# Patient Record
Sex: FEMALE | Race: WHITE | NOT HISPANIC OR LATINO | Employment: FULL TIME | URBAN - METROPOLITAN AREA
[De-identification: names, ages, dates, MRNs, and addresses within clinical notes are randomized per-mention and may not be internally consistent; named-entity substitution may affect disease eponyms.]

---

## 2017-03-10 ENCOUNTER — OFFICE VISIT (OUTPATIENT)
Dept: FAMILY MEDICINE CLINIC | Facility: CLINIC | Age: 22
End: 2017-03-10

## 2017-03-10 VITALS
HEART RATE: 64 BPM | BODY MASS INDEX: 27.01 KG/M2 | DIASTOLIC BLOOD PRESSURE: 58 MMHG | WEIGHT: 134 LBS | HEIGHT: 59 IN | TEMPERATURE: 98.4 F | OXYGEN SATURATION: 97 % | SYSTOLIC BLOOD PRESSURE: 108 MMHG | RESPIRATION RATE: 16 BRPM

## 2017-03-10 DIAGNOSIS — F41.9 ANXIETY: Primary | ICD-10-CM

## 2017-03-10 PROCEDURE — 99213 OFFICE O/P EST LOW 20 MIN: CPT | Performed by: PHYSICIAN ASSISTANT

## 2017-03-10 NOTE — PROGRESS NOTES
"Subjective   Rianna Montano is a 21 y.o. female.   Chief Complaint   Patient presents with   • Anxiety   • Follow-up       History of Present Illness   Rianna is a 1 year old female who present for anxiety.  She has been taking the Zoloft 50 mg once a day.  She has sen an improvement on the medication.   Denied any suicidal or homicidal thoughts.  Appetite and sleep has been normal.  Rianna has no complaints at today's office visit.        The following portions of the patient's history were reviewed and updated as appropriate: allergies, current medications, past family history, past medical history, past social history and past surgical history.    Review of Systems   Constitutional: Negative.    HENT: Negative.    Eyes: Negative.    Respiratory: Negative.    Cardiovascular: Negative.    Gastrointestinal: Negative.    Endocrine: Negative.    Genitourinary: Negative.    Musculoskeletal: Negative.    Skin: Negative.    Allergic/Immunologic: Negative.    Neurological: Negative.    Hematological: Negative.    Psychiatric/Behavioral: Negative.  Negative for sleep disturbance and suicidal ideas.   All other systems reviewed and are negative.    Vitals:    03/10/17 1545   BP: 108/58   BP Location: Right arm   Patient Position: Sitting   Cuff Size: Adult   Pulse: 64   Resp: 16   Temp: 98.4 °F (36.9 °C)   TempSrc: Oral   SpO2: 97%   Weight: 134 lb (60.8 kg)   Height: 58.5\" (148.6 cm)     Wt Readings from Last 3 Encounters:   03/10/17 134 lb (60.8 kg)   12/09/16 139 lb (63 kg)   09/30/16 133 lb (60.3 kg)       BP Readings from Last 3 Encounters:   03/10/17 108/58   12/09/16 110/60   09/30/16 120/70     Body mass index is 27.53 kg/(m^2).    Allergies   Allergen Reactions   • Other      Dogs, Cats       Objective   Physical Exam   Constitutional: She is oriented to person, place, and time. Vital signs are normal. She appears well-developed and well-nourished.   Neck: Trachea normal and phonation normal. Neck supple. "   Cardiovascular: Normal rate, regular rhythm, S1 normal, S2 normal, normal heart sounds and normal pulses.    Pulmonary/Chest: Effort normal and breath sounds normal.   Abdominal: Soft. Normal appearance and bowel sounds are normal. There is no hepatomegaly. There is no tenderness.   Neurological: She is alert and oriented to person, place, and time.   Skin: Skin is warm, dry and intact.   Psychiatric: She has a normal mood and affect. Her speech is normal and behavior is normal. Judgment and thought content normal. Cognition and memory are normal.       Assessment/Plan   Rianna was seen today for anxiety and follow-up.    Diagnoses and all orders for this visit:    Anxiety  -     sertraline (ZOLOFT) 50 MG tablet; Take 1 tablet by mouth Daily.        Ms. Rianna Montano was seen in office today for follow-up on anxiety.  She is currently doing well with her sertraline 50 mg prescription.  I have refilled this to her local pharmacy.  Rianna will return to office in 6 months for reevaluation.        Dragon transcription disclaimer    Much of this encounter note is an electronic transcription/translation of spoken language to printed text.  The electronic translation of spoken language may permit erroneous, or at times, nonsensical words or phrases to be inadvertently transcribed.  Although I have reviewed the note for such errors, some may still exist.    Mirela Eisenberg PA-C  Family Practice

## 2017-05-25 ENCOUNTER — OFFICE VISIT (OUTPATIENT)
Dept: FAMILY MEDICINE CLINIC | Facility: CLINIC | Age: 22
End: 2017-05-25

## 2017-05-25 VITALS
BODY MASS INDEX: 28.43 KG/M2 | WEIGHT: 141 LBS | OXYGEN SATURATION: 98 % | DIASTOLIC BLOOD PRESSURE: 64 MMHG | HEART RATE: 87 BPM | HEIGHT: 59 IN | RESPIRATION RATE: 18 BRPM | SYSTOLIC BLOOD PRESSURE: 92 MMHG

## 2017-05-25 DIAGNOSIS — B37.9 CANDIDA ALBICANS INFECTION: Primary | ICD-10-CM

## 2017-05-25 PROCEDURE — 99212 OFFICE O/P EST SF 10 MIN: CPT | Performed by: NURSE PRACTITIONER

## 2017-05-25 RX ORDER — CLOTRIMAZOLE AND BETAMETHASONE DIPROPIONATE 10; .64 MG/G; MG/G
CREAM TOPICAL 2 TIMES DAILY
Qty: 45 G | Refills: 0 | Status: SHIPPED | OUTPATIENT
Start: 2017-05-25 | End: 2017-06-16

## 2017-06-07 ENCOUNTER — OFFICE VISIT (OUTPATIENT)
Dept: FAMILY MEDICINE CLINIC | Facility: CLINIC | Age: 22
End: 2017-06-07

## 2017-06-07 VITALS
RESPIRATION RATE: 16 BRPM | HEART RATE: 73 BPM | SYSTOLIC BLOOD PRESSURE: 122 MMHG | BODY MASS INDEX: 28.22 KG/M2 | WEIGHT: 140 LBS | DIASTOLIC BLOOD PRESSURE: 68 MMHG | OXYGEN SATURATION: 100 % | HEIGHT: 59 IN | TEMPERATURE: 98 F

## 2017-06-07 DIAGNOSIS — L98.9 SCALP LESION: Primary | ICD-10-CM

## 2017-06-07 PROCEDURE — 99213 OFFICE O/P EST LOW 20 MIN: CPT | Performed by: PHYSICIAN ASSISTANT

## 2017-06-07 NOTE — PROGRESS NOTES
"Subjective   Rianna Montano is a 22 y.o. female.   Chief Complaint   Patient presents with   • bump on head     History of Present Illness     Rianna is a 22-year-old female who presents for evaluation of \"bump on head\". Mother States that she noticed a \"bump\" on top of her head.  Rianna has been picking at it off and on.  The \"bump into \"bump appeared in April that has gotten bigger since then.  She has noticed some bleeding after she has picked at lesion.  Rianna has neosporin with some relief.  Appetite and sleep has been normal.    The following portions of the patient's history were reviewed and updated as appropriate: allergies, current medications, past family history, past medical history, past social history and past surgical history.    Review of Systems   Constitutional: Negative.    HENT: Negative.    Eyes: Negative.    Respiratory: Negative.    Cardiovascular: Negative.    Gastrointestinal: Negative.    Endocrine: Negative.    Genitourinary: Negative.    Musculoskeletal: Negative.    Skin: Negative.         Scalp lesion   Allergic/Immunologic: Negative.    Neurological: Negative.    Hematological: Negative.    Psychiatric/Behavioral: Negative.    All other systems reviewed and are negative.    Vitals:    06/07/17 1519   BP: 122/68   BP Location: Right arm   Patient Position: Sitting   Cuff Size: Adult   Pulse: 73   Resp: 16   Temp: 98 °F (36.7 °C)   SpO2: 100%   Weight: 140 lb (63.5 kg)   Height: 58.5\" (148.6 cm)       Wt Readings from Last 3 Encounters:   06/07/17 140 lb (63.5 kg)   05/25/17 141 lb (64 kg)   03/10/17 134 lb (60.8 kg)       BP Readings from Last 3 Encounters:   06/07/17 122/68   05/25/17 92/64   03/10/17 108/58     Body mass index is 28.76 kg/(m^2).   Allergies   Allergen Reactions   • Other      Dogs, Cats     Objective   Physical Exam   Constitutional: She is oriented to person, place, and time. Vital signs are normal. She appears well-developed and well-nourished.   Neck: Trachea " normal and phonation normal. No edema present.   Cardiovascular: Normal rate, regular rhythm, S1 normal, S2 normal, normal heart sounds and normal pulses.    Pulmonary/Chest: Effort normal and breath sounds normal.   Abdominal: Soft. Normal appearance and bowel sounds are normal. There is no hepatomegaly. There is no tenderness.   Neurological: She is alert and oriented to person, place, and time.   Skin: Skin is warm, dry and intact. Lesion noted.        Psychiatric: She has a normal mood and affect. Her speech is normal and behavior is normal. Judgment and thought content normal. Cognition and memory are normal.       Assessment/Plan   Rianna was seen today for bump on head.    Diagnoses and all orders for this visit:    Scalp lesion  -     Ambulatory Referral to General Surgery      Mr. Rianna Montano was seen in office today for new atypical scalp lesion.  I will schedule a referral to general surgeon for evaluation and treatment.      Dragon transcription disclaimer    Much of this encounter note is an electronic transcription/translation of spoken language to printed text.  The electronic translation of spoken language may permit erroneous, or at times, nonsensical words or phrases to be inadvertently transcribed.  Although I have reviewed the note for such errors, some may still exist.    Mirela Eisenberg PA-C  Family Practice

## 2017-06-16 ENCOUNTER — OFFICE VISIT (OUTPATIENT)
Dept: SURGERY | Facility: CLINIC | Age: 22
End: 2017-06-16

## 2017-06-16 VITALS
HEIGHT: 59 IN | HEART RATE: 60 BPM | RESPIRATION RATE: 12 BRPM | BODY MASS INDEX: 27.62 KG/M2 | SYSTOLIC BLOOD PRESSURE: 110 MMHG | TEMPERATURE: 98.8 F | OXYGEN SATURATION: 98 % | DIASTOLIC BLOOD PRESSURE: 62 MMHG | WEIGHT: 137 LBS

## 2017-06-16 DIAGNOSIS — L98.0 PYOGENIC GRANULOMA: ICD-10-CM

## 2017-06-16 DIAGNOSIS — R22.0 SCALP MASS: Primary | ICD-10-CM

## 2017-06-16 PROCEDURE — 99203 OFFICE O/P NEW LOW 30 MIN: CPT | Performed by: SURGERY

## 2017-06-16 NOTE — PROGRESS NOTES
"    PATIENT INFORMATION  Rianna Montano   - 1995    CHIEF COMPLAINT  Chief Complaint   Patient presents with   • Suspicious Skin Lesion   SCALP LESION POSTERIOR X 2 MONTHS, DOES BLEED, HAS INCREASED IN SIZE  Referral from Mirela Griffin PAC    HISTORY OF PRESENT ILLNESS  HPI    Patient is a very pleasant 22-year-old female referred by Mirela Griffin for \"a bump on my head\" she reports she noticed this a few months ago.  It has increased in size and will bleed upon touching and any other kind of trauma.  She has been \"picking on it\"  She does also complain of some occasional serous to bloody drainage.  Denies any fevers chills.  She is not aware of any other skin lesions or masses.  She does have a family history of basal cell cancer in her mother, maternal grandmother and paternal grandfather.  She is unaware of family history or personal history of soft tissue/ skin infections.    REVIEW OF SYSTEMS  Review of Systems   Constitutional: Negative.    Respiratory: Negative.    Cardiovascular: Negative.    Gastrointestinal: Negative.    Skin: Positive for color change.        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]   • Abnormal weight gain [R63.5]   • Annual physical exam [Z00.00]   • Anxiety [F41.9]   • Pain in thoracic spine [M54.6]   • Pendulous breast [N64.89]   • Pityriasis rosea [L42]         PAST MEDICAL HISTORY  Past Medical History:   Diagnosis Date   • Bronchitis          SURGICAL HISTORY  Past Surgical History:   Procedure Laterality Date   • BILATERAL BREAST REDUCTION     • TONSILLECTOMY AND ADENOIDECTOMY           FAMILY HISTORY  Family History   Problem Relation Age of Onset   • Depression Mother    • Anxiety disorder Mother    • Basal cell carcinoma Mother    • Hypertension Father    • Hypertension Paternal Grandmother    • Basal cell carcinoma Maternal Grandmother    • Skin " "cancer Paternal Grandfather          SOCIAL HISTORY  Social History     Occupational History   • Not on file.     Social History Main Topics   • Smoking status: Never Smoker   • Smokeless tobacco: Not on file   • Alcohol use No   • Drug use: Not on file   • Sexual activity: Not on file         CURRENT MEDICATIONS    Current Outpatient Prescriptions:   •  PREVIFEM 0.25-35 MG-MCG per tablet, , Disp: , Rfl: 0  •  sertraline (ZOLOFT) 50 MG tablet, Take 1 tablet by mouth Daily., Disp: 30 tablet, Rfl: 6  •  spironolactone (ALDACTONE) 50 MG tablet, TK 1 T PO BID, Disp: , Rfl: 11    ALLERGIES  Other    VITALS  Vitals:    06/16/17 1110   BP: 110/62   Pulse: 60   Resp: 12   Temp: 98.8 °F (37.1 °C)   SpO2: 98%   Weight: 137 lb (62.1 kg)   Height: 58.5\" (148.6 cm)       LAST RESULTS   Hospital Outpatient Visit on 12/18/2015   Component Date Value Ref Range Status   • WBC 12/16/2015 9.16  4.50 - 10.70 K/Cumm Final   • RBC 12/16/2015 5.09  3.90 - 5.20 Million Final   • Hemoglobin 12/16/2015 13.1  11.9 - 15.5 g/dL Final   • Hematocrit 12/16/2015 41.8  35.6 - 45.5 % Final   • MCV 12/16/2015 82.1  80.5 - 98.2 fL Final   • MCH 12/16/2015 25.7* 26.9 - 32.0 pg Final   • MCHC 12/16/2015 31.3* 32.4 - 36.3 g/dL Final   • RDW 12/16/2015 15.8* 11.7 - 13.0 % Final   • Platelets 12/16/2015 249  140 - 500 K/Cumm Final   • HCG Qualitative 12/16/2015 Negative   Final   • CONVERTED (HISTORICAL) CASE TYPE 12/18/2015 Surgical Pathology   Final   • CONVERTED (HISTORICAL) ACCESSION N* 12/18/2015 [id number]   Final   • CONVERTED (HISTORICAL) RESULT STAT* 12/18/2015 FINAL   Final   • CONVERTED (HISTORICAL) SPECIMEN DE* 12/18/2015 RIGHT BREAST TISSUE - 513 GRAMS REMOVED FROM PATIENT @ 08:18  PLACED IN FORMALIN @ 09:03. jl   Final     No results found.    PHYSICAL EXAM  Physical Exam   Constitutional: She appears well-developed and well-nourished.   HENT:   Head: Normocephalic and atraumatic.   Posterior scalp occipital region there is a 2 cm x 1.5 cm " mass that appears to be a pyogenic granuloma.  No bleeding noted.   Cardiovascular: Normal rate, regular rhythm and normal heart sounds.    Pulmonary/Chest: Breath sounds normal.   Abdominal: Soft. Bowel sounds are normal.   Nursing note and vitals reviewed.      ASSESSMENT  Scalp mass    We have discussed options of excision with Rianna and her parents.  We discussed excision in the office under local anesthesia versus in the operating room with IV sedation and local anesthesia.  Pros and cons risks and benefits of all been discussed in detail.  They would like to proceed with excision in the operating room.  She will be scheduled for wide excision at [facility].  The procedure, risks, benefits, complications including but not limited to risk of bleeding, infection, hematoma formation, seroma formation, flap ischemia, flap necrosis,risk of residual disease, risk of recurrence and complications associated with sedation were thoroughly discussed with them they understand and gave verbal informed consent.    PLAN  Follow-up after surgery.  Patient was advised to call the office sooner should she have worsening symptoms or go to the nearest emergency room.

## 2017-06-17 NOTE — H&P
"    PATIENT INFORMATION  Rianna Montano   - 1995    CHIEF COMPLAINT  Chief Complaint   Patient presents with   • Suspicious Skin Lesion   SCALP LESION POSTERIOR X 2 MONTHS, DOES BLEED, HAS INCREASED IN SIZE  Referral from Mirela Griffin PAC    HISTORY OF PRESENT ILLNESS  HPI    Patient is a very pleasant 22-year-old female referred by Mirela Griffin for \"a bump on my head\" she reports she noticed this a few months ago.  It has increased in size and will bleed upon touching and any other kind of trauma.  She has been \"picking on it\"  She does also complain of some occasional serous to bloody drainage.  Denies any fevers chills.  She is not aware of any other skin lesions or masses.  She does have a family history of basal cell cancer in her mother, maternal grandmother and paternal grandfather.  She is unaware of family history or personal history of soft tissue/ skin infections.    REVIEW OF SYSTEMS  Review of Systems   Constitutional: Negative.    Respiratory: Negative.    Cardiovascular: Negative.    Gastrointestinal: Negative.    Skin: Positive for color change.        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]   • Abnormal weight gain [R63.5]   • Annual physical exam [Z00.00]   • Anxiety [F41.9]   • Pain in thoracic spine [M54.6]   • Pendulous breast [N64.89]   • Pityriasis rosea [L42]         PAST MEDICAL HISTORY  Past Medical History:   Diagnosis Date   • Bronchitis          SURGICAL HISTORY  Past Surgical History:   Procedure Laterality Date   • BILATERAL BREAST REDUCTION     • TONSILLECTOMY AND ADENOIDECTOMY           FAMILY HISTORY  Family History   Problem Relation Age of Onset   • Depression Mother    • Anxiety disorder Mother    • Basal cell carcinoma Mother    • Hypertension Father    • Hypertension Paternal Grandmother    • Basal cell carcinoma Maternal Grandmother    • Skin " "cancer Paternal Grandfather          SOCIAL HISTORY  Social History     Occupational History   • Not on file.     Social History Main Topics   • Smoking status: Never Smoker   • Smokeless tobacco: Not on file   • Alcohol use No   • Drug use: Not on file   • Sexual activity: Not on file         CURRENT MEDICATIONS    Current Outpatient Prescriptions:   •  PREVIFEM 0.25-35 MG-MCG per tablet, , Disp: , Rfl: 0  •  sertraline (ZOLOFT) 50 MG tablet, Take 1 tablet by mouth Daily., Disp: 30 tablet, Rfl: 6  •  spironolactone (ALDACTONE) 50 MG tablet, TK 1 T PO BID, Disp: , Rfl: 11    ALLERGIES  Other    VITALS  Vitals:    06/16/17 1110   BP: 110/62   Pulse: 60   Resp: 12   Temp: 98.8 °F (37.1 °C)   SpO2: 98%   Weight: 137 lb (62.1 kg)   Height: 58.5\" (148.6 cm)       LAST RESULTS   Hospital Outpatient Visit on 12/18/2015   Component Date Value Ref Range Status   • WBC 12/16/2015 9.16  4.50 - 10.70 K/Cumm Final   • RBC 12/16/2015 5.09  3.90 - 5.20 Million Final   • Hemoglobin 12/16/2015 13.1  11.9 - 15.5 g/dL Final   • Hematocrit 12/16/2015 41.8  35.6 - 45.5 % Final   • MCV 12/16/2015 82.1  80.5 - 98.2 fL Final   • MCH 12/16/2015 25.7* 26.9 - 32.0 pg Final   • MCHC 12/16/2015 31.3* 32.4 - 36.3 g/dL Final   • RDW 12/16/2015 15.8* 11.7 - 13.0 % Final   • Platelets 12/16/2015 249  140 - 500 K/Cumm Final   • HCG Qualitative 12/16/2015 Negative   Final   • CONVERTED (HISTORICAL) CASE TYPE 12/18/2015 Surgical Pathology   Final   • CONVERTED (HISTORICAL) ACCESSION N* 12/18/2015 [id number]   Final   • CONVERTED (HISTORICAL) RESULT STAT* 12/18/2015 FINAL   Final   • CONVERTED (HISTORICAL) SPECIMEN DE* 12/18/2015 RIGHT BREAST TISSUE - 513 GRAMS REMOVED FROM PATIENT @ 08:18  PLACED IN FORMALIN @ 09:03. jl   Final     No results found.    PHYSICAL EXAM  Physical Exam   Constitutional: She appears well-developed and well-nourished.   HENT:   Head: Normocephalic and atraumatic.   Posterior scalp occipital region there is a 2 cm x 1.5 cm " mass that appears to be a pyogenic granuloma.  No bleeding noted.   Cardiovascular: Normal rate, regular rhythm and normal heart sounds.    Pulmonary/Chest: Breath sounds normal.   Abdominal: Soft. Bowel sounds are normal.   Nursing note and vitals reviewed.      ASSESSMENT  Scalp mass    We have discussed options of excision with Rianna and her parents.  We discussed excision in the office under local anesthesia versus in the operating room with IV sedation and local anesthesia.  Pros and cons risks and benefits of all been discussed in detail.  They would like to proceed with excision in the operating room.  She will be scheduled for wide excision at [facility].  The procedure, risks, benefits, complications including but not limited to risk of bleeding, infection, hematoma formation, seroma formation, flap ischemia, flap necrosis,risk of residual disease, risk of recurrence and complications associated with sedation were thoroughly discussed with them they understand and gave verbal informed consent.    PLAN  Follow-up after surgery.  Patient was advised to call the office sooner should she have worsening symptoms or go to the nearest emergency room.

## 2017-06-19 ENCOUNTER — ANESTHESIA EVENT (OUTPATIENT)
Dept: PERIOP | Facility: HOSPITAL | Age: 22
End: 2017-06-19

## 2017-06-20 ENCOUNTER — HOSPITAL ENCOUNTER (OUTPATIENT)
Facility: HOSPITAL | Age: 22
Setting detail: HOSPITAL OUTPATIENT SURGERY
Discharge: HOME | End: 2017-06-20
Attending: SURGERY | Admitting: SURGERY

## 2017-06-20 ENCOUNTER — ANESTHESIA (OUTPATIENT)
Dept: PERIOP | Facility: HOSPITAL | Age: 22
End: 2017-06-20

## 2017-06-20 VITALS
HEART RATE: 70 BPM | WEIGHT: 138.25 LBS | HEIGHT: 59 IN | RESPIRATION RATE: 16 BRPM | OXYGEN SATURATION: 98 % | BODY MASS INDEX: 27.87 KG/M2 | SYSTOLIC BLOOD PRESSURE: 113 MMHG | TEMPERATURE: 98.1 F | DIASTOLIC BLOOD PRESSURE: 82 MMHG

## 2017-06-20 DIAGNOSIS — L98.0 PYOGENIC GRANULOMA: ICD-10-CM

## 2017-06-20 DIAGNOSIS — R22.0 SCALP MASS: ICD-10-CM

## 2017-06-20 LAB
BASOPHILS # BLD AUTO: 0.04 10*3/MM3 (ref 0–0.2)
BASOPHILS NFR BLD AUTO: 0.5 % (ref 0–2)
DEPRECATED RDW RBC AUTO: 43.8 FL (ref 37–54)
EOSINOPHIL # BLD AUTO: 0.34 10*3/MM3 (ref 0.1–0.3)
EOSINOPHIL NFR BLD AUTO: 4.3 % (ref 0–4)
ERYTHROCYTE [DISTWIDTH] IN BLOOD BY AUTOMATED COUNT: 15.2 % (ref 11.5–14.5)
HCG SERPL QL: NEGATIVE
HCT VFR BLD AUTO: 38.1 % (ref 37–47)
HGB BLD-MCNC: 12.3 G/DL (ref 12–16)
IMM GRANULOCYTES # BLD: 0.01 10*3/MM3 (ref 0–0.03)
IMM GRANULOCYTES NFR BLD: 0.1 % (ref 0–0.5)
LYMPHOCYTES # BLD AUTO: 2.34 10*3/MM3 (ref 0.6–4.8)
LYMPHOCYTES NFR BLD AUTO: 29.3 % (ref 20–45)
MCH RBC QN AUTO: 25.7 PG (ref 27–31)
MCHC RBC AUTO-ENTMCNC: 32.3 G/DL (ref 31–37)
MCV RBC AUTO: 79.7 FL (ref 81–99)
MONOCYTES # BLD AUTO: 0.51 10*3/MM3 (ref 0–1)
MONOCYTES NFR BLD AUTO: 6.4 % (ref 3–8)
NEUTROPHILS # BLD AUTO: 4.76 10*3/MM3 (ref 1.5–8.3)
NEUTROPHILS NFR BLD AUTO: 59.4 % (ref 45–70)
NRBC BLD MANUAL-RTO: 0 /100 WBC (ref 0–0)
PLATELET # BLD AUTO: 273 10*3/MM3 (ref 140–500)
PMV BLD AUTO: 10.9 FL (ref 7.4–10.4)
RBC # BLD AUTO: 4.78 10*6/MM3 (ref 4.2–5.4)
WBC NRBC COR # BLD: 8 10*3/MM3 (ref 4.8–10.8)

## 2017-06-20 PROCEDURE — 84703 CHORIONIC GONADOTROPIN ASSAY: CPT | Performed by: ANESTHESIOLOGY

## 2017-06-20 PROCEDURE — 25010000003 CEFAZOLIN PER 500 MG: Performed by: SURGERY

## 2017-06-20 PROCEDURE — 25010000002 PROPOFOL 10 MG/ML EMULSION: Performed by: NURSE ANESTHETIST, CERTIFIED REGISTERED

## 2017-06-20 PROCEDURE — 85025 COMPLETE CBC W/AUTO DIFF WBC: CPT | Performed by: SURGERY

## 2017-06-20 PROCEDURE — 25010000002 ONDANSETRON PER 1 MG: Performed by: ANESTHESIOLOGY

## 2017-06-20 PROCEDURE — 25010000002 MIDAZOLAM PER 1 MG: Performed by: ANESTHESIOLOGY

## 2017-06-20 PROCEDURE — 11422 EXC H-F-NK-SP B9+MARG 1.1-2: CPT | Performed by: SURGERY

## 2017-06-20 RX ORDER — MORPHINE SULFATE 10 MG/ML
2 INJECTION INTRAMUSCULAR; INTRAVENOUS; SUBCUTANEOUS
Status: CANCELLED | OUTPATIENT
Start: 2017-06-20 | End: 2017-06-20

## 2017-06-20 RX ORDER — SULFAMETHOXAZOLE AND TRIMETHOPRIM 800; 160 MG/1; MG/1
1 TABLET ORAL 2 TIMES DAILY
Qty: 10 TABLET | Refills: 0 | Status: SHIPPED | OUTPATIENT
Start: 2017-06-20 | End: 2017-06-25

## 2017-06-20 RX ORDER — MIDAZOLAM HYDROCHLORIDE 1 MG/ML
2 INJECTION INTRAMUSCULAR; INTRAVENOUS
Status: DISCONTINUED | OUTPATIENT
Start: 2017-06-20 | End: 2017-06-20 | Stop reason: HOSPADM

## 2017-06-20 RX ORDER — HYDROCODONE BITARTRATE AND ACETAMINOPHEN 5; 325 MG/1; MG/1
1 TABLET ORAL EVERY 6 HOURS PRN
Qty: 20 TABLET | Refills: 0 | Status: SHIPPED | OUTPATIENT
Start: 2017-06-20 | End: 2017-07-03

## 2017-06-20 RX ORDER — OXYCODONE HYDROCHLORIDE AND ACETAMINOPHEN 5; 325 MG/1; MG/1
1 TABLET ORAL ONCE AS NEEDED
Status: CANCELLED | OUTPATIENT
Start: 2017-06-20 | End: 2017-06-30

## 2017-06-20 RX ORDER — MEPERIDINE HYDROCHLORIDE 25 MG/ML
12.5 INJECTION INTRAMUSCULAR; INTRAVENOUS; SUBCUTANEOUS
Status: CANCELLED | OUTPATIENT
Start: 2017-06-20 | End: 2017-06-21

## 2017-06-20 RX ORDER — FAMOTIDINE 20 MG/1
20 TABLET, FILM COATED ORAL
Status: DISCONTINUED | OUTPATIENT
Start: 2017-06-20 | End: 2017-06-20 | Stop reason: HOSPADM

## 2017-06-20 RX ORDER — LIDOCAINE HYDROCHLORIDE AND EPINEPHRINE 10; 10 MG/ML; UG/ML
INJECTION, SOLUTION INFILTRATION; PERINEURAL AS NEEDED
Status: DISCONTINUED | OUTPATIENT
Start: 2017-06-20 | End: 2017-06-20 | Stop reason: HOSPADM

## 2017-06-20 RX ORDER — FAMOTIDINE 10 MG/ML
20 INJECTION, SOLUTION INTRAVENOUS
Status: DISCONTINUED | OUTPATIENT
Start: 2017-06-20 | End: 2017-06-20 | Stop reason: HOSPADM

## 2017-06-20 RX ORDER — ONDANSETRON 2 MG/ML
4 INJECTION INTRAMUSCULAR; INTRAVENOUS ONCE AS NEEDED
Status: DISCONTINUED | OUTPATIENT
Start: 2017-06-20 | End: 2017-06-20 | Stop reason: HOSPADM

## 2017-06-20 RX ORDER — ONDANSETRON 2 MG/ML
4 INJECTION INTRAMUSCULAR; INTRAVENOUS ONCE AS NEEDED
Status: CANCELLED | OUTPATIENT
Start: 2017-06-20

## 2017-06-20 RX ORDER — ONDANSETRON 2 MG/ML
4 INJECTION INTRAMUSCULAR; INTRAVENOUS ONCE AS NEEDED
Status: COMPLETED | OUTPATIENT
Start: 2017-06-20 | End: 2017-06-20

## 2017-06-20 RX ORDER — MIDAZOLAM HYDROCHLORIDE 1 MG/ML
1 INJECTION INTRAMUSCULAR; INTRAVENOUS
Status: DISCONTINUED | OUTPATIENT
Start: 2017-06-20 | End: 2017-06-20 | Stop reason: HOSPADM

## 2017-06-20 RX ORDER — LIDOCAINE HYDROCHLORIDE 10 MG/ML
0.5 INJECTION, SOLUTION EPIDURAL; INFILTRATION; INTRACAUDAL; PERINEURAL ONCE AS NEEDED
Status: COMPLETED | OUTPATIENT
Start: 2017-06-20 | End: 2017-06-20

## 2017-06-20 RX ORDER — SODIUM CHLORIDE 0.9 % (FLUSH) 0.9 %
1-10 SYRINGE (ML) INJECTION AS NEEDED
Status: DISCONTINUED | OUTPATIENT
Start: 2017-06-20 | End: 2017-06-20 | Stop reason: HOSPADM

## 2017-06-20 RX ORDER — SODIUM CHLORIDE, SODIUM LACTATE, POTASSIUM CHLORIDE, CALCIUM CHLORIDE 600; 310; 30; 20 MG/100ML; MG/100ML; MG/100ML; MG/100ML
9 INJECTION, SOLUTION INTRAVENOUS CONTINUOUS
Status: DISCONTINUED | OUTPATIENT
Start: 2017-06-20 | End: 2017-06-20 | Stop reason: HOSPADM

## 2017-06-20 RX ORDER — MAGNESIUM HYDROXIDE 1200 MG/15ML
LIQUID ORAL AS NEEDED
Status: DISCONTINUED | OUTPATIENT
Start: 2017-06-20 | End: 2017-06-20 | Stop reason: HOSPADM

## 2017-06-20 RX ORDER — KETOROLAC TROMETHAMINE 30 MG/ML
30 INJECTION, SOLUTION INTRAMUSCULAR; INTRAVENOUS EVERY 6 HOURS PRN
Status: CANCELLED | OUTPATIENT
Start: 2017-06-20 | End: 2017-06-23

## 2017-06-20 RX ORDER — HYDROCODONE BITARTRATE AND ACETAMINOPHEN 5; 325 MG/1; MG/1
1 TABLET ORAL ONCE AS NEEDED
Status: CANCELLED | OUTPATIENT
Start: 2017-06-20 | End: 2017-06-21

## 2017-06-20 RX ORDER — LIDOCAINE HYDROCHLORIDE 20 MG/ML
INJECTION, SOLUTION INFILTRATION; PERINEURAL AS NEEDED
Status: DISCONTINUED | OUTPATIENT
Start: 2017-06-20 | End: 2017-06-20 | Stop reason: SURG

## 2017-06-20 RX ORDER — PROPOFOL 10 MG/ML
VIAL (ML) INTRAVENOUS AS NEEDED
Status: DISCONTINUED | OUTPATIENT
Start: 2017-06-20 | End: 2017-06-20 | Stop reason: SURG

## 2017-06-20 RX ORDER — BUPIVACAINE HYDROCHLORIDE 2.5 MG/ML
INJECTION, SOLUTION EPIDURAL; INFILTRATION; INTRACAUDAL AS NEEDED
Status: DISCONTINUED | OUTPATIENT
Start: 2017-06-20 | End: 2017-06-20 | Stop reason: HOSPADM

## 2017-06-20 RX ADMIN — LIDOCAINE HYDROCHLORIDE 0.5 ML: 10 INJECTION, SOLUTION EPIDURAL; INFILTRATION; INTRACAUDAL; PERINEURAL at 08:10

## 2017-06-20 RX ADMIN — MIDAZOLAM HYDROCHLORIDE 1 MG: 1 INJECTION, SOLUTION INTRAMUSCULAR; INTRAVENOUS at 08:50

## 2017-06-20 RX ADMIN — LIDOCAINE HYDROCHLORIDE 80 MG: 20 INJECTION, SOLUTION INFILTRATION; PERINEURAL at 09:06

## 2017-06-20 RX ADMIN — CEFAZOLIN SODIUM 2 G: 2 SOLUTION INTRAVENOUS at 08:57

## 2017-06-20 RX ADMIN — FAMOTIDINE 20 MG: 10 INJECTION INTRAVENOUS at 08:24

## 2017-06-20 RX ADMIN — SODIUM CHLORIDE, POTASSIUM CHLORIDE, SODIUM LACTATE AND CALCIUM CHLORIDE 9 ML/HR: 600; 310; 30; 20 INJECTION, SOLUTION INTRAVENOUS at 08:10

## 2017-06-20 RX ADMIN — SODIUM CHLORIDE, POTASSIUM CHLORIDE, SODIUM LACTATE AND CALCIUM CHLORIDE: 600; 310; 30; 20 INJECTION, SOLUTION INTRAVENOUS at 08:28

## 2017-06-20 RX ADMIN — PROPOFOL 100 MG: 10 INJECTION, EMULSION INTRAVENOUS at 09:09

## 2017-06-20 RX ADMIN — ONDANSETRON 4 MG: 2 INJECTION, SOLUTION INTRAMUSCULAR; INTRAVENOUS at 08:24

## 2017-06-20 NOTE — ANESTHESIA POSTPROCEDURE EVALUATION
Patient: Rianna Montano    Procedure Summary     Date Anesthesia Start Anesthesia Stop Room / Location    06/20/17 0852 [id number] [facility] / [facility]       Procedure Diagnosis Surgeon Provider    HEAD NECK LESION/CYST EXCISION  Wide excision of scalp mass (N/A ) Pyogenic granuloma; Scalp mass  (Pyogenic granuloma [L98.0]; Scalp mass [R22.0]) MD Gonzalo Grewal CRNA          Anesthesia Type: MAC  Last vitals  BP      Temp      Pulse     Resp      SpO2        Post Anesthesia Care and Evaluation    Patient location during evaluation: PHASE II  Patient participation: complete - patient participated  Level of consciousness: awake and alert  Pain management: adequate  Airway patency: patent  Anesthetic complications: No anesthetic complications  PONV Status: none  Cardiovascular status: acceptable and hemodynamically stable  Respiratory status: acceptable  Hydration status: acceptable

## 2017-06-20 NOTE — ANESTHESIA PREPROCEDURE EVALUATION
Anesthesia Evaluation     Patient summary reviewed and Nursing notes reviewed   History of anesthetic complications: problem with morphine- eyes rolled back and nausea (with tonsillectomy)  NPO Solid Status: > 8 hours  NPO Liquid Status: > 8 hours     Airway   Mallampati: I  TM distance: >3 FB  Neck ROM: full  no difficulty expected  Dental - normal exam     Pulmonary - normal exam   Cardiovascular - normal exam  Exercise tolerance: good (4-7 METS)    Rhythm: regular  Rate: normal        Neuro/Psych  Seizures: possibly with morphine.  GI/Hepatic/Renal/Endo - negative ROS     Musculoskeletal (-) negative ROS    Abdominal  - normal exam   Substance History - negative use     OB/GYN          Other        Blood dyscrasia: history of anemia secondary to heavy menses.                                  Anesthesia Plan    ASA 1     MAC     intravenous induction   Anesthetic plan and risks discussed with patient and mother.  Use of blood products discussed with patient and mother .

## 2017-06-20 NOTE — OP NOTE
Date of procedure: 6/20/2017    Preoperative diagnosis: Scalp mass  Postoperative diagnosis: Same    Procedure: Wide excision scalp mass  Surgeon: Domi Hardin M.D.  Asst.: None  Anesthesia: Mac and local  Anesthesia provider: David Fraser CRNA  IVF: Per anesthesia record  EBL: 5 mL  Specimens: Scalp mass to permanent pathology    Indications for procedure: Patient is a 22-year-old female referred to general surgery with the aforementioned complaints.  She was advised excision under local anesthesia in the office versus under Mac.  Pros and cons/ risks and benefits were all discussed with her and her parents wanted to proceed with the procedure under Mac and local.  She was scheduled to undergo the excision in the operating room.  The procedure, risks, benefits, complications including but not limited to risk of bleeding, infection, hematoma formation, seroma formation, risk of flap ischemia or necrosis, risk of residual disease, risk of recurrence requiring additional procedures as well as complications associated with anesthesia were thoroughly discussed with them who understood and gave informed consent.    Description of the procedure: Patient was identified by me in the preoperative area brought to the operating room suite in supine in the operating room table.  She was then laid in the left lateral decubitus position.  After appropriate cardiopulmonary monitoring was instituted Mac anesthesia was provided by the anesthesia services please refer to the records for complete details.  The area was prepped and draped in usual sterile surgical fashion.  Local anesthetic was infiltrated.  Using a 15 surgical blade a wide elliptical excision was performed.  Incision was deepened through dermis into subcutaneous tissue and the mass was completely excised.  It measured 1 cm in length 1.4 cm in width and 0.7 cm in depth.  The wound was irrigated and hemostasis was assured with electrocautery.  There was a small bleed on  the skin edge that was immediately controlled with electrocautery.  The scalp was extremely thick.  Skin flaps had to be created.  Subcutaneous closure was achieved with 3-0 Vicryl in an interrupted fashion.  Skin edges were then approximated with 2-0 nylon in a running fashion.  Additionally 2-0 nylon vertical mattress sutures were placed along the top and bottom edges of the incision.  The wound was cleansed and Dermabond was applied.  Patient was then awakened, her anesthesia was reversed and she was taken to recovery room in stable condition. Sponge, instrument, needle counts were all correct at the end of the procedure ×2.  Patient tolerated the procedure well.

## 2017-06-20 NOTE — H&P (VIEW-ONLY)
"    PATIENT INFORMATION  Rianna Montano   - 1995    CHIEF COMPLAINT  Chief Complaint   Patient presents with   • Suspicious Skin Lesion   SCALP LESION POSTERIOR X 2 MONTHS, DOES BLEED, HAS INCREASED IN SIZE  Referral from Mirela Griffin PAC    HISTORY OF PRESENT ILLNESS  HPI    Patient is a very pleasant 22-year-old female referred by Mirela Griffin for \"a bump on my head\" she reports she noticed this a few months ago.  It has increased in size and will bleed upon touching and any other kind of trauma.  She has been \"picking on it\"  She does also complain of some occasional serous to bloody drainage.  Denies any fevers chills.  She is not aware of any other skin lesions or masses.  She does have a family history of basal cell cancer in her mother, maternal grandmother and paternal grandfather.  She is unaware of family history or personal history of soft tissue/ skin infections.    REVIEW OF SYSTEMS  Review of Systems   Constitutional: Negative.    Respiratory: Negative.    Cardiovascular: Negative.    Gastrointestinal: Negative.    Skin: Positive for color change.        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]   • Abnormal weight gain [R63.5]   • Annual physical exam [Z00.00]   • Anxiety [F41.9]   • Pain in thoracic spine [M54.6]   • Pendulous breast [N64.89]   • Pityriasis rosea [L42]         PAST MEDICAL HISTORY  Past Medical History:   Diagnosis Date   • Bronchitis          SURGICAL HISTORY  Past Surgical History:   Procedure Laterality Date   • BILATERAL BREAST REDUCTION     • TONSILLECTOMY AND ADENOIDECTOMY           FAMILY HISTORY  Family History   Problem Relation Age of Onset   • Depression Mother    • Anxiety disorder Mother    • Basal cell carcinoma Mother    • Hypertension Father    • Hypertension Paternal Grandmother    • Basal cell carcinoma Maternal Grandmother    • Skin " "cancer Paternal Grandfather          SOCIAL HISTORY  Social History     Occupational History   • Not on file.     Social History Main Topics   • Smoking status: Never Smoker   • Smokeless tobacco: Not on file   • Alcohol use No   • Drug use: Not on file   • Sexual activity: Not on file         CURRENT MEDICATIONS    Current Outpatient Prescriptions:   •  PREVIFEM 0.25-35 MG-MCG per tablet, , Disp: , Rfl: 0  •  sertraline (ZOLOFT) 50 MG tablet, Take 1 tablet by mouth Daily., Disp: 30 tablet, Rfl: 6  •  spironolactone (ALDACTONE) 50 MG tablet, TK 1 T PO BID, Disp: , Rfl: 11    ALLERGIES  Other    VITALS  Vitals:    06/16/17 1110   BP: 110/62   Pulse: 60   Resp: 12   Temp: 98.8 °F (37.1 °C)   SpO2: 98%   Weight: 137 lb (62.1 kg)   Height: 58.5\" (148.6 cm)       LAST RESULTS   Hospital Outpatient Visit on 12/18/2015   Component Date Value Ref Range Status   • WBC 12/16/2015 9.16  4.50 - 10.70 K/Cumm Final   • RBC 12/16/2015 5.09  3.90 - 5.20 Million Final   • Hemoglobin 12/16/2015 13.1  11.9 - 15.5 g/dL Final   • Hematocrit 12/16/2015 41.8  35.6 - 45.5 % Final   • MCV 12/16/2015 82.1  80.5 - 98.2 fL Final   • MCH 12/16/2015 25.7* 26.9 - 32.0 pg Final   • MCHC 12/16/2015 31.3* 32.4 - 36.3 g/dL Final   • RDW 12/16/2015 15.8* 11.7 - 13.0 % Final   • Platelets 12/16/2015 249  140 - 500 K/Cumm Final   • HCG Qualitative 12/16/2015 Negative   Final   • CONVERTED (HISTORICAL) CASE TYPE 12/18/2015 Surgical Pathology   Final   • CONVERTED (HISTORICAL) ACCESSION N* 12/18/2015 [id number]   Final   • CONVERTED (HISTORICAL) RESULT STAT* 12/18/2015 FINAL   Final   • CONVERTED (HISTORICAL) SPECIMEN DE* 12/18/2015 RIGHT BREAST TISSUE - 513 GRAMS REMOVED FROM PATIENT @ 08:18  PLACED IN FORMALIN @ 09:03. jl   Final     No results found.    PHYSICAL EXAM  Physical Exam   Constitutional: She appears well-developed and well-nourished.   HENT:   Head: Normocephalic and atraumatic.   Posterior scalp occipital region there is a 2 cm x 1.5 cm " mass that appears to be a pyogenic granuloma.  No bleeding noted.   Cardiovascular: Normal rate, regular rhythm and normal heart sounds.    Pulmonary/Chest: Breath sounds normal.   Abdominal: Soft. Bowel sounds are normal.   Nursing note and vitals reviewed.      ASSESSMENT  Scalp mass    We have discussed options of excision with Rianna and her parents.  We discussed excision in the office under local anesthesia versus in the operating room with IV sedation and local anesthesia.  Pros and cons risks and benefits of all been discussed in detail.  They would like to proceed with excision in the operating room.  She will be scheduled for wide excision at [facility].  The procedure, risks, benefits, complications including but not limited to risk of bleeding, infection, hematoma formation, seroma formation, flap ischemia, flap necrosis,risk of residual disease, risk of recurrence and complications associated with sedation were thoroughly discussed with them they understand and gave verbal informed consent.    PLAN  Follow-up after surgery.  Patient was advised to call the office sooner should she have worsening symptoms or go to the nearest emergency room.

## 2017-06-21 LAB
LAB AP CASE REPORT: NORMAL
Lab: NORMAL
PATH REPORT.FINAL DX SPEC: NORMAL

## 2017-07-03 ENCOUNTER — OFFICE VISIT (OUTPATIENT)
Dept: SURGERY | Facility: CLINIC | Age: 22
End: 2017-07-03

## 2017-07-03 VITALS
BODY MASS INDEX: 27.62 KG/M2 | DIASTOLIC BLOOD PRESSURE: 80 MMHG | WEIGHT: 137 LBS | HEIGHT: 59 IN | SYSTOLIC BLOOD PRESSURE: 112 MMHG

## 2017-07-03 DIAGNOSIS — Z09 SURGERY FOLLOW-UP: Primary | ICD-10-CM

## 2017-07-03 PROCEDURE — 99024 POSTOP FOLLOW-UP VISIT: CPT | Performed by: SURGERY

## 2017-07-03 NOTE — PROGRESS NOTES
PATIENT INFORMATION  Rianna Montano   - 1995    CHIEF COMPLAINT  Chief Complaint   Patient presents with   • Post-op Follow-up   2 wks 6 days s/p exc scalp lesion, pt is w/o complaints    HISTORY OF PRESENT ILLNESS  HPI  Patient presents to the office today for her first postoperative visit.  She is status post excision of a scalp mass/lesion.  She reports she is doing well.  Denies any fevers chills drainage or erythema around the site.      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]   • Abnormal weight gain [R63.5]   • Annual physical exam [Z00.00]   • Anxiety [F41.9]   • Pain in thoracic spine [M54.6]   • Pendulous breast [N64.89]   • Pityriasis rosea [L42]         PAST MEDICAL HISTORY  Past Medical History:   Diagnosis Date   • Bronchitis          SURGICAL HISTORY  Past Surgical History:   Procedure Laterality Date   • BILATERAL BREAST REDUCTION     • HEAD/NECK LESION/CYST EXCISION N/A 2017    Procedure: HEAD NECK LESION/CYST EXCISION  Wide excision of scalp mass;  Surgeon: Domi Hardin MD;  [facility];  Service:    • HYMENECTOMY     • TONSILLECTOMY AND ADENOIDECTOMY           FAMILY HISTORY  Family History   Problem Relation Age of Onset   • Depression Mother    • Anxiety disorder Mother    • Basal cell carcinoma Mother    • Hypertension Father    • Hypertension Paternal Grandmother    • Basal cell carcinoma Maternal Grandmother    • Skin cancer Paternal Grandfather          SOCIAL HISTORY  Social History     Occupational History   • Not on file.     Social History Main Topics   • Smoking status: Never Smoker   • Smokeless tobacco: Not on file   • Alcohol use No   • Drug use: No   • Sexual activity: Defer         CURRENT MEDICATIONS    Current  "Outpatient Prescriptions:   •  PREVIFEM 0.25-35 MG-MCG per tablet, , Disp: , Rfl: 0  •  sertraline (ZOLOFT) 50 MG tablet, Take 1 tablet by mouth Daily., Disp: 30 tablet, Rfl: 6  •  spironolactone (ALDACTONE) 50 MG tablet, TK 1 T PO BID, Disp: , Rfl: 11    ALLERGIES  Other    VITALS  Vitals:    07/03/17 0908   BP: 112/80   Weight: 137 lb (62.1 kg)   Height: 58.75\" (149.2 cm)       LAST RESULTS   Admission on 06/20/2017, Discharged on 06/20/2017   Component Date Value Ref Range Status   • WBC 06/20/2017 8.00  4.80 - 10.80 10*3/mm3 Final   • RBC 06/20/2017 4.78  4.20 - 5.40 10*6/mm3 Final   • Hemoglobin 06/20/2017 12.3  12.0 - 16.0 g/dL Final   • Hematocrit 06/20/2017 38.1  37.0 - 47.0 % Final   • MCV 06/20/2017 79.7* 81.0 - 99.0 fL Final   • MCH 06/20/2017 25.7* 27.0 - 31.0 pg Final   • MCHC 06/20/2017 32.3  31.0 - 37.0 g/dL Final   • RDW 06/20/2017 15.2* 11.5 - 14.5 % Final   • RDW-SD 06/20/2017 43.8  37.0 - 54.0 fl Final   • MPV 06/20/2017 10.9* 7.4 - 10.4 fL Final   • Platelets 06/20/2017 273  140 - 500 10*3/mm3 Final   • Neutrophil % 06/20/2017 59.4  45.0 - 70.0 % Final   • Lymphocyte % 06/20/2017 29.3  20.0 - 45.0 % Final   • Monocyte % 06/20/2017 6.4  3.0 - 8.0 % Final   • Eosinophil % 06/20/2017 4.3* 0.0 - 4.0 % Final   • Basophil % 06/20/2017 0.5  0.0 - 2.0 % Final   • Immature Grans % 06/20/2017 0.1  0.0 - 0.5 % Final   • Neutrophils, Absolute 06/20/2017 4.76  1.50 - 8.30 10*3/mm3 Final   • Lymphocytes, Absolute 06/20/2017 2.34  0.60 - 4.80 10*3/mm3 Final   • Monocytes, Absolute 06/20/2017 0.51  0.00 - 1.00 10*3/mm3 Final   • Eosinophils, Absolute 06/20/2017 0.34* 0.10 - 0.30 10*3/mm3 Final   • Basophils, Absolute 06/20/2017 0.04  0.00 - 0.20 10*3/mm3 Final   • Immature Grans, Absolute 06/20/2017 0.01  0.00 - 0.03 10*3/mm3 Final   • nRBC 06/20/2017 0.0  0.0 - 0.0 /100 WBC Final   • HCG Qualitative 06/20/2017 Negative  Negative Final   • Case Report 06/20/2017    Final                    Value:Surgical " Pathology Report                         Case: [id number]                                  Authorizing Provider:  Domi Hardin MD       Collected:           06/20/2017 09:28 AM          Ordering Location:     [facility]   Received:            06/20/2017 10:31 AM                                 OR                                                                           Pathologist:           Alvaro Barreto MD                                                      Specimen:    Scalp, scalp mass 1 cm L X 1.4 cm w X 0.7 cm D                                            • Final Diagnosis 06/20/2017    Final                    Value:This result contains rich text formatting which cannot be displayed here.     No results found.    PHYSICAL EXAM  Physical Exam   Constitutional: She appears well-developed and well-nourished.   HENT:   Incision healing well.  Stitches removed.   Nursing note and vitals reviewed.      ASSESSMENT  Status post excision scalp mass.  Wound healing nicely.  Operative and pathology results discussed.  Pathology consistent with pyogenic granuloma - benign    Risk of recurrence discussed with patient.      PLAN  Follow-up 2 weeks for wound check.  Patient was advised to call the office sooner should she worsening symptoms or go to the nearest emergency room.

## 2017-07-17 ENCOUNTER — OFFICE VISIT (OUTPATIENT)
Dept: SURGERY | Facility: CLINIC | Age: 22
End: 2017-07-17

## 2017-07-17 VITALS
TEMPERATURE: 97.8 F | WEIGHT: 136.8 LBS | HEIGHT: 59 IN | OXYGEN SATURATION: 99 % | DIASTOLIC BLOOD PRESSURE: 80 MMHG | BODY MASS INDEX: 27.58 KG/M2 | HEART RATE: 96 BPM | SYSTOLIC BLOOD PRESSURE: 114 MMHG

## 2017-07-17 DIAGNOSIS — Z09 SURGERY FOLLOW-UP: Primary | ICD-10-CM

## 2017-07-17 PROCEDURE — 99024 POSTOP FOLLOW-UP VISIT: CPT | Performed by: SURGERY

## 2017-07-17 NOTE — PROGRESS NOTES
PATIENT INFORMATION  Rianna Montano   - 1995    CHIEF COMPLAINT  Chief Complaint   Patient presents with   • Post-op Follow-up     4 WK S/P SCALP MASS       HISTORY OF PRESENT ILLNESS  HPI  Patient was as the office today for routine postoperative visit.  She status post excision of a scalp mass.  She offers no complaints.  Denies any fevers chills redness or drainage from the surgical incision.  She is resume normal household activities and work.      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]   • Abnormal weight gain [R63.5]   • Annual physical exam [Z00.00]   • Anxiety [F41.9]   • Pain in thoracic spine [M54.6]   • Pendulous breast [N64.89]   • Pityriasis rosea [L42]         PAST MEDICAL HISTORY  Past Medical History:   Diagnosis Date   • Bronchitis          SURGICAL HISTORY  Past Surgical History:   Procedure Laterality Date   • BILATERAL BREAST REDUCTION     • HEAD/NECK LESION/CYST EXCISION N/A 2017    Procedure: HEAD NECK LESION/CYST EXCISION  Wide excision of scalp mass;  Surgeon: Domi Hardin MD;  Location: [facility];  Service:    • HYMENECTOMY     • TONSILLECTOMY AND ADENOIDECTOMY           FAMILY HISTORY  Family History   Problem Relation Age of Onset   • Depression Mother    • Anxiety disorder Mother    • Basal cell carcinoma Mother    • Hypertension Father    • Hypertension Paternal Grandmother    • Basal cell carcinoma Maternal Grandmother    • Skin cancer Paternal Grandfather          SOCIAL HISTORY  Social History     Occupational History   • Not on file.     Social History Main Topics   • Smoking status: Never Smoker   • Smokeless tobacco: Not on file   • Alcohol use No   • Drug use: No   • Sexual activity: Defer         CURRENT  "MEDICATIONS    Current Outpatient Prescriptions:   •  PREVIFEM 0.25-35 MG-MCG per tablet, , Disp: , Rfl: 0  •  sertraline (ZOLOFT) 50 MG tablet, Take 1 tablet by mouth Daily., Disp: 30 tablet, Rfl: 6  •  spironolactone (ALDACTONE) 50 MG tablet, TK 1 T PO BID, Disp: , Rfl: 11    ALLERGIES  Other    VITALS  Vitals:    07/17/17 0914   BP: 114/80   Pulse: 96   Temp: 97.8 °F (36.6 °C)   TempSrc: Oral   SpO2: 99%   Weight: 136 lb 12.8 oz (62.1 kg)   Height: 58.75\" (149.2 cm)       LAST RESULTS   Admission on 06/20/2017, Discharged on 06/20/2017   Component Date Value Ref Range Status   • WBC 06/20/2017 8.00  4.80 - 10.80 10*3/mm3 Final   • RBC 06/20/2017 4.78  4.20 - 5.40 10*6/mm3 Final   • Hemoglobin 06/20/2017 12.3  12.0 - 16.0 g/dL Final   • Hematocrit 06/20/2017 38.1  37.0 - 47.0 % Final   • MCV 06/20/2017 79.7* 81.0 - 99.0 fL Final   • MCH 06/20/2017 25.7* 27.0 - 31.0 pg Final   • MCHC 06/20/2017 32.3  31.0 - 37.0 g/dL Final   • RDW 06/20/2017 15.2* 11.5 - 14.5 % Final   • RDW-SD 06/20/2017 43.8  37.0 - 54.0 fl Final   • MPV 06/20/2017 10.9* 7.4 - 10.4 fL Final   • Platelets 06/20/2017 273  140 - 500 10*3/mm3 Final   • Neutrophil % 06/20/2017 59.4  45.0 - 70.0 % Final   • Lymphocyte % 06/20/2017 29.3  20.0 - 45.0 % Final   • Monocyte % 06/20/2017 6.4  3.0 - 8.0 % Final   • Eosinophil % 06/20/2017 4.3* 0.0 - 4.0 % Final   • Basophil % 06/20/2017 0.5  0.0 - 2.0 % Final   • Immature Grans % 06/20/2017 0.1  0.0 - 0.5 % Final   • Neutrophils, Absolute 06/20/2017 4.76  1.50 - 8.30 10*3/mm3 Final   • Lymphocytes, Absolute 06/20/2017 2.34  0.60 - 4.80 10*3/mm3 Final   • Monocytes, Absolute 06/20/2017 0.51  0.00 - 1.00 10*3/mm3 Final   • Eosinophils, Absolute 06/20/2017 0.34* 0.10 - 0.30 10*3/mm3 Final   • Basophils, Absolute 06/20/2017 0.04  0.00 - 0.20 10*3/mm3 Final   • Immature Grans, Absolute 06/20/2017 0.01  0.00 - 0.03 10*3/mm3 Final   • nRBC 06/20/2017 0.0  0.0 - 0.0 /100 WBC Final   • HCG Qualitative 06/20/2017 " Negative  Negative Final   • Case Report 06/20/2017    Final                    Value:Surgical Pathology Report                         Case: [id number]                                  Authorizing Provider:  Domi Hardin MD       Collected:           06/20/2017 09:28 AM          Ordering Location[facility]     [facility]   Received:            06/20/2017 10:31 AM                                 OR                                                                           Pathologist:           Alvaro Barreto MD                                                      Specimen:    Scalp, scalp mass 1 cm L X 1.4 cm w X 0.7 cm D                                            • Final Diagnosis 06/20/2017    Final                    Value:This result contains rich text formatting which cannot be displayed here.     No results found.    PHYSICAL EXAM  Physical Exam   Constitutional: She appears well-developed and well-nourished.   HENT:   Scalp incision/wound completely healed.  No erythema, no drainage no recurrence noted   Cardiovascular: Normal rate, regular rhythm and normal heart sounds.    Pulmonary/Chest: Breath sounds normal.   Abdominal: Soft. Bowel sounds are normal.   Nursing note and vitals reviewed.      ASSESSMENT  Status post excision scalp mass.  Wounds well-healed.  No acute surgical issues at this time.    Risk of recurrence discussed with patient.       PLAN  Follow-up when necessary/as needed.  Patient was advised to call the office sooner should she have worsening symptoms or go to the nearest emergency room.

## 2017-09-15 ENCOUNTER — OFFICE VISIT (OUTPATIENT)
Dept: FAMILY MEDICINE CLINIC | Facility: CLINIC | Age: 22
End: 2017-09-15

## 2017-09-15 VITALS
BODY MASS INDEX: 27.01 KG/M2 | HEART RATE: 88 BPM | HEIGHT: 59 IN | OXYGEN SATURATION: 98 % | TEMPERATURE: 98.4 F | WEIGHT: 134 LBS | DIASTOLIC BLOOD PRESSURE: 62 MMHG | SYSTOLIC BLOOD PRESSURE: 100 MMHG | RESPIRATION RATE: 16 BRPM

## 2017-09-15 DIAGNOSIS — T78.40XA ALLERGIC REACTION, INITIAL ENCOUNTER: ICD-10-CM

## 2017-09-15 DIAGNOSIS — F41.9 ANXIETY: Primary | ICD-10-CM

## 2017-09-15 PROCEDURE — 96372 THER/PROPH/DIAG INJ SC/IM: CPT | Performed by: PHYSICIAN ASSISTANT

## 2017-09-15 PROCEDURE — 99213 OFFICE O/P EST LOW 20 MIN: CPT | Performed by: PHYSICIAN ASSISTANT

## 2017-09-15 RX ORDER — METHYLPREDNISOLONE ACETATE 40 MG/ML
40 INJECTION, SUSPENSION INTRA-ARTICULAR; INTRALESIONAL; INTRAMUSCULAR; SOFT TISSUE ONCE
Status: COMPLETED | OUTPATIENT
Start: 2017-09-15 | End: 2017-09-15

## 2017-09-15 RX ORDER — METHYLPREDNISOLONE 4 MG/1
TABLET ORAL
Qty: 21 TABLET | Refills: 0 | Status: SHIPPED | OUTPATIENT
Start: 2017-09-15 | End: 2017-12-15

## 2017-09-15 RX ADMIN — METHYLPREDNISOLONE ACETATE 40 MG: 40 INJECTION, SUSPENSION INTRA-ARTICULAR; INTRALESIONAL; INTRAMUSCULAR; SOFT TISSUE at 15:43

## 2017-09-15 NOTE — PROGRESS NOTES
Subjective   Rianna Montano is a 22 y.o. female.   Chief Complaint   Patient presents with   • Anxiety     management   • Allergic Reaction     face swollen due to new lotion       History of Present Illness     Rianna is a 22-year-old female who presents for anxiety management.  She has been taking her Sertraline 50 mg tablets daily. She thinks the medication is working but has had breakthrough anxiety during her menstrual cycles.  She has had a couple episodes of increased anxiety when she has confrontations or when she gets upset.  Denied any suicidal or homicidal thoughts. Sleep eagle been normal.  Sleep has been normal.  Going to [employer].  School is going well. She stated started using Neutrogena facial cream that has SPF 15 2 days ago.  Since this time she's developed a red itchy rash and swollen face.  She has not changed any other lotions, soaps or foods.  Rianna has not used any over-the-counter medications for the allergic reaction.  Rianna denied any shortness of breath, difficulty swallowing or breathing.  Last LMP was September 15,2017.      The following portions of the patient's history were reviewed and updated as appropriate: allergies, current medications, past family history, past medical history, past social history and past surgical history.    Review of Systems   Constitutional: Negative.    HENT: Positive for facial swelling. Negative for congestion, ear discharge, ear pain, mouth sores, postnasal drip, rhinorrhea, sinus pressure, sneezing, sore throat and trouble swallowing.    Eyes: Negative.    Respiratory: Negative.  Negative for shortness of breath and wheezing.    Cardiovascular: Negative.  Negative for chest pain, palpitations and leg swelling.   Gastrointestinal: Negative.    Endocrine: Negative.    Genitourinary: Negative.    Musculoskeletal: Negative.    Skin: Positive for rash.   Allergic/Immunologic: Negative.    Neurological: Negative.  Negative for dizziness, light-headedness and  "headaches.   Hematological: Negative.    Psychiatric/Behavioral: Negative.  Negative for sleep disturbance and suicidal ideas.   All other systems reviewed and are negative.    Vitals:    09/15/17 1419   BP: 100/62   BP Location: Left arm   Patient Position: Sitting   Cuff Size: Adult   Pulse: 88   Resp: 16   Temp: 98.4 °F (36.9 °C)   TempSrc: Oral   SpO2: 98%   Weight: 134 lb (60.8 kg)   Height: 58.75\" (149.2 cm)       Wt Readings from Last 3 Encounters:   09/15/17 134 lb (60.8 kg)   07/17/17 136 lb 12.8 oz (62.1 kg)   07/03/17 137 lb (62.1 kg)       BP Readings from Last 3 Encounters:   09/15/17 100/62   07/17/17 114/80   07/03/17 112/80     Body mass index is 27.3 kg/(m^2).  Allergies   Allergen Reactions   • Other      Dogs, Cats       Objective   Physical Exam   Constitutional: She is oriented to person, place, and time. Vital signs are normal. She appears well-developed and well-nourished.   HENT:   Head: Normocephalic and atraumatic.   Right Ear: Hearing, tympanic membrane, external ear and ear canal normal.   Left Ear: Hearing, tympanic membrane, external ear and ear canal normal.   Nose: Nose normal. Right sinus exhibits no maxillary sinus tenderness and no frontal sinus tenderness. Left sinus exhibits no maxillary sinus tenderness and no frontal sinus tenderness.   Mouth/Throat: Uvula is midline, oropharynx is clear and moist and mucous membranes are normal.   Eyes: Conjunctivae, EOM and lids are normal.   Neck: Trachea normal and phonation normal. Neck supple. No edema present.   Cardiovascular: Normal rate, regular rhythm, S1 normal, S2 normal, normal heart sounds and normal pulses.    Pulmonary/Chest: Effort normal and breath sounds normal.   Abdominal: Soft. Normal appearance, normal aorta and bowel sounds are normal. There is no hepatomegaly. There is no tenderness.   Lymphadenopathy:     She has no cervical adenopathy.   Neurological: She is alert and oriented to person, place, and time.   Skin: Skin " is warm, dry and intact. Rash noted.   There is an erythematous rash on forehead and cheeks bilaterally.  Her face is puffy and swollen.   Psychiatric: She has a normal mood and affect. Her speech is normal and behavior is normal. Judgment and thought content normal. Cognition and memory are normal.       Assessment/Plan   Rianna was seen today for anxiety and allergic reaction.    Diagnoses and all orders for this visit:    Anxiety  -     sertraline (ZOLOFT) 50 MG tablet; Take 1 1/2 tablets once a day    Allergic reaction, initial encounter  -     methylPREDNISolone acetate (DEPO-medrol) injection 40 mg; Inject 1 mL into the shoulder, thigh, or buttocks 1 (One) Time.  -     MethylPREDNISolone (MEDROL, DIAMANTE,) 4 MG tablet; Take as directed on package instructions.    1.  Anxiety: I will increase Rianna's sertraline to 75 mg a day.  This is due to her having occasional breakthrough anxiety especially around her cycle.  She will schedule follow-up appointment in 3 months for reevaluation.  I have sent a new prescription to pharmacy.  2.  New allergic reaction to Neutrogena facial cream: Rianna will have a Depo-Medrol 40 mg IM injection at today's office visit.  She will start the Medrol Dosepak tomorrow.  I've instructed her to purchase over-the-counter Claritin or Benadryl to help with itching and swelling.  If her symptoms worsen, she will go to the emergency room.  Rinana voiced understanding.          Anil transcription disclaimer    Much of this encounter note is an electronic transcription/translation of spoken language to printed text.  The electronic translation of spoken language may permit erroneous, or at times, nonsensical words or phrases to be inadvertently transcribed.  Although I have reviewed the note for such errors, some may still exist.    Mirela Eisenberg PA-C  Family Practice

## 2017-12-15 ENCOUNTER — OFFICE VISIT (OUTPATIENT)
Dept: FAMILY MEDICINE CLINIC | Facility: CLINIC | Age: 22
End: 2017-12-15

## 2017-12-15 VITALS
OXYGEN SATURATION: 99 % | RESPIRATION RATE: 16 BRPM | DIASTOLIC BLOOD PRESSURE: 72 MMHG | HEIGHT: 59 IN | SYSTOLIC BLOOD PRESSURE: 112 MMHG | TEMPERATURE: 98.4 F | WEIGHT: 134 LBS | HEART RATE: 97 BPM | BODY MASS INDEX: 27.01 KG/M2

## 2017-12-15 DIAGNOSIS — D48.9 NEOPLASM OF UNCERTAIN BEHAVIOR: ICD-10-CM

## 2017-12-15 DIAGNOSIS — F41.9 ANXIETY: Primary | ICD-10-CM

## 2017-12-15 PROCEDURE — 99212 OFFICE O/P EST SF 10 MIN: CPT | Performed by: PHYSICIAN ASSISTANT

## 2017-12-15 NOTE — PROGRESS NOTES
"Subjective   Rianna Montano is a 22 y.o. female.   Chief Complaint   Patient presents with   • Anxiety     management       History of Present Illness     Rianna is a 22-year-old female who presents for anxiety management.  She has been taking sertraline 75 mg once a day. Rianna is doing well with the medication. Denied any suicidal or homicidal thoughts.  Appetite and sleep has been normal. Rianna is going to  and did well.       The following portions of the patient's history were reviewed and updated as appropriate: allergies, current medications, past family history, past medical history, past social history and past surgical history.    Review of Systems   Constitutional: Negative.    HENT: Negative.    Eyes: Negative.    Respiratory: Negative.    Cardiovascular: Negative.    Gastrointestinal: Negative.    Endocrine: Negative.    Genitourinary: Negative.    Musculoskeletal: Negative.    Skin: Negative.    Allergic/Immunologic: Negative.    Neurological: Negative.    Hematological: Negative.    Psychiatric/Behavioral: Negative.  Negative for sleep disturbance and suicidal ideas.   All other systems reviewed and are negative.    Vitals:    12/15/17 1411   BP: 112/72   BP Location: Right arm   Patient Position: Sitting   Cuff Size: Adult   Pulse: 97   Resp: 16   Temp: 98.4 °F (36.9 °C)   TempSrc: Oral   SpO2: 99%   Weight: 60.8 kg (134 lb)   Height: 149.2 cm (58.75\")       Wt Readings from Last 3 Encounters:   12/15/17 60.8 kg (134 lb)   09/15/17 60.8 kg (134 lb)   07/17/17 62.1 kg (136 lb 12.8 oz)       BP Readings from Last 3 Encounters:   12/15/17 112/72   09/15/17 100/62   07/17/17 114/80     Body mass index is 27.3 kg/(m^2).  Allergies   Allergen Reactions   • Other      Dogs, Cats       Objective   Physical Exam   Constitutional: She is oriented to person, place, and time. Vital signs are normal. She appears well-developed and well-nourished.   Neck: Trachea normal and phonation normal. Neck supple. No " edema present.   Cardiovascular: Normal rate, regular rhythm, S1 normal, S2 normal, normal heart sounds and normal pulses.    Pulmonary/Chest: Effort normal and breath sounds normal.   Abdominal: Soft. Normal appearance and bowel sounds are normal. There is no hepatomegaly. There is no tenderness.   Neurological: She is alert and oriented to person, place, and time.   Skin: Skin is warm, dry and intact.        Psychiatric: She has a normal mood and affect. Her speech is normal and behavior is normal. Judgment and thought content normal. Cognition and memory are normal.       Assessment/Plan   Rianna was seen today for anxiety.    Diagnoses and all orders for this visit:    Anxiety    Neoplasm of uncertain behavior  -     Ambulatory Referral to Dermatology      1.  Stable and chronic anxiety:  Ms. Rianna Montano was seen in office today for follow-up on anxiety.  Rianna is doing well with her 75 mg of sertraline.  No refills are required at this time.  Rianna will return to office in 6 months for reevaluation.  2.  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  I will place a referral to dermatology for further evaluation and treatment.  Rianna was encouraged to use good sunscreen.        Dragon transcription disclaimer    Much of this encounter note is an electronic transcription/translation of spoken language to printed text.  The electronic translation of spoken language may permit erroneous, or at times, nonsensical words or phrases to be inadvertently transcribed.  Although I have reviewed the note for such errors, some may still exist.    Mirela Eisenberg PA-C  Family Practice

## 2018-03-02 ENCOUNTER — CLINICAL SUPPORT (OUTPATIENT)
Dept: FAMILY MEDICINE CLINIC | Facility: CLINIC | Age: 23
End: 2018-03-02

## 2018-03-02 VITALS
WEIGHT: 140 LBS | OXYGEN SATURATION: 100 % | DIASTOLIC BLOOD PRESSURE: 68 MMHG | HEIGHT: 59 IN | HEART RATE: 74 BPM | TEMPERATURE: 98.3 F | RESPIRATION RATE: 16 BRPM | SYSTOLIC BLOOD PRESSURE: 102 MMHG | BODY MASS INDEX: 28.22 KG/M2

## 2018-03-02 DIAGNOSIS — Z11.1 SCREENING EXAMINATION FOR PULMONARY TUBERCULOSIS: ICD-10-CM

## 2018-03-02 DIAGNOSIS — F41.9 ANXIETY: ICD-10-CM

## 2018-03-02 DIAGNOSIS — Z00.00 ENCOUNTER FOR ANNUAL PHYSICAL EXAM: Primary | ICD-10-CM

## 2018-03-02 PROCEDURE — 99395 PREV VISIT EST AGE 18-39: CPT | Performed by: PHYSICIAN ASSISTANT

## 2018-03-02 NOTE — PROGRESS NOTES
"Subjective   Rianna Montano is a 22 y.o. female.   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  Denied any Foreign travel in the past 6 months.  She is doing well with the Sertraline 50 mg once a day.  Denied any Suicidal or Homicidal thoughts.   States the medication ha helped with her anxiety. She takes one tablet pain daily. Denied ay URI symptoms,chest pain,shortness of air,wheezing,abdominal pain,or swelling of ankles.  Appetite has been normal.  Sleep has been good.  Last LMP was February 28,2018.      The following portions of the patient's history were reviewed and updated as appropriate: allergies, current medications, past family history, past medical history, past social history and past surgical history.    Review of Systems   Constitutional: Negative.    HENT: Negative.    Eyes: Negative.    Respiratory: Negative.    Cardiovascular: Negative.    Gastrointestinal: Negative.    Endocrine: Negative.    Genitourinary: Negative.    Musculoskeletal: Negative.    Skin: Negative.    Allergic/Immunologic: Negative.    Neurological: Negative.    Hematological: Negative.    Psychiatric/Behavioral: Negative for sleep disturbance and suicidal ideas. The patient is nervous/anxious.    All other systems reviewed and are negative.    Social History   Substance Use Topics   • Smoking status: Never Smoker   • Smokeless tobacco: None   • Alcohol use No       Vitals:    03/02/18 0950   BP: 102/68   BP Location: Right arm   Patient Position: Sitting   Cuff Size: Adult   Pulse: 74   Resp: 16   Temp: 98.3 °F (36.8 °C)   SpO2: 100%   Weight: 63.5 kg (140 lb)   Height: 149.2 cm (58.75\")       Wt Readings from Last 3 Encounters:   03/02/18 63.5 kg (140 lb)   12/15/17 60.8 kg (134 lb)   09/15/17 60.8 kg (134 lb) "       BP Readings from Last 3 Encounters:   03/02/18 102/68   12/15/17 112/72   09/15/17 100/62     Body mass index is 28.52 kg/(m^2).     Allergies   Allergen Reactions   • Other      Dogs, Cats     Objective   Physical Exam   Constitutional: She is oriented to person, place, and time. Vital signs are normal. She appears well-developed and well-nourished.   HENT:   Head: Normocephalic and atraumatic.   Right Ear: Hearing, tympanic membrane, external ear and ear canal normal.   Left Ear: Hearing, tympanic membrane, external ear and ear canal normal.   Nose: Nose normal. Right sinus exhibits no maxillary sinus tenderness and no frontal sinus tenderness. Left sinus exhibits no maxillary sinus tenderness and no frontal sinus tenderness.   Mouth/Throat: Uvula is midline, oropharynx is clear and moist and mucous membranes are normal.   Eyes: Conjunctivae, EOM and lids are normal. Pupils are equal, round, and reactive to light.   Neck: Trachea normal and phonation normal. Neck supple. No edema present. No thyromegaly present.   Cardiovascular: Normal rate, regular rhythm, S1 normal, S2 normal, normal heart sounds and normal pulses.    Pulmonary/Chest: Effort normal and breath sounds normal.   Abdominal: Soft. Normal appearance, normal aorta and bowel sounds are normal. There is no hepatomegaly. There is no tenderness.   Lymphadenopathy:     She has no cervical adenopathy.   Neurological: She is alert and oriented to person, place, and time.   Reflex Scores:       Patellar reflexes are 2+ on the right side and 2+ on the left side.  Skin: Skin is warm, dry and intact.   Psychiatric: She has a normal mood and affect. Her speech is normal and behavior is normal. Judgment and thought content normal. Cognition and memory are normal.       Assessment/Plan   Rianna was seen today for annual exam.    Diagnoses and all orders for this visit:    Encounter for annual physical exam    Anxiety  -     sertraline (ZOLOFT) 50 MG tablet;  Take 1 tablet by mouth Daily.    Screening examination for pulmonary tuberculosis  -     QuantiFERON TB Gold      1.  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  She also is required for a TB skin test.  She will have a QuantiFERON blood work collected for further evaluation of TB exposure.  She is a healthy young woman.  I will complete her physical form after TB screening test is completed.  2.  Chronic and stable anxiety: Doing well with the sertraline medication.  I have sent a refill to pharmacy.  Rianna will return to office in 6 months for reevaluation.

## 2018-03-06 LAB
ANNOTATION COMMENT IMP: NORMAL
GAMMA INTERFERON BACKGROUND BLD IA-ACNC: 0.02 IU/ML
M TB IFN-G BLD-IMP: NEGATIVE
M TB IFN-G CD4+ BCKGRND COR BLD-ACNC: 0.11 IU/ML
M TB IFN-G CD4+ T-CELLS BLD-ACNC: 0.13 IU/ML
MITOGEN IGNF BLD-ACNC: >10 IU/ML
QUANTIFERON INCUBATION: NORMAL
SERVICE CMNT-IMP: NORMAL

## 2018-03-07 ENCOUNTER — TELEPHONE (OUTPATIENT)
Dept: FAMILY MEDICINE CLINIC | Facility: CLINIC | Age: 23
End: 2018-03-07

## 2018-03-07 NOTE — TELEPHONE ENCOUNTER
----- Message from Mirela Eisenberg PA-C sent at 3/7/2018  6:37 AM EST -----  Notify patient that her QuantiFERON TB test was negative.  Please give her a copy.  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

## 2018-06-20 ENCOUNTER — OFFICE VISIT (OUTPATIENT)
Dept: FAMILY MEDICINE CLINIC | Facility: CLINIC | Age: 23
End: 2018-06-20

## 2018-06-20 VITALS
SYSTOLIC BLOOD PRESSURE: 110 MMHG | BODY MASS INDEX: 28.63 KG/M2 | DIASTOLIC BLOOD PRESSURE: 64 MMHG | OXYGEN SATURATION: 97 % | WEIGHT: 142 LBS | RESPIRATION RATE: 16 BRPM | HEART RATE: 69 BPM | HEIGHT: 59 IN | TEMPERATURE: 98.4 F

## 2018-06-20 DIAGNOSIS — F41.9 ANXIETY: Primary | ICD-10-CM

## 2018-06-20 PROCEDURE — 99212 OFFICE O/P EST SF 10 MIN: CPT | Performed by: PHYSICIAN ASSISTANT

## 2018-06-20 NOTE — PROGRESS NOTES
"Subjective   Rianna Montano is a 23 y.o. female.   Chief Complaint   Patient presents with   • Anxiety     management       History of Present Illness     Rianna is a 23 year old female presents for anxiety management.  She is student teaching in the fall.  She is doing well with the Sertraline 50 mg a day.  Denied any Suicidal or homicidal thoughts.  Appetite and sleep has been normal.  Last LMP was May 21,2018.      The following portions of the patient's history were reviewed and updated as appropriate: allergies, current medications, past family history, past medical history, past social history and past surgical history.    Review of Systems   Constitutional: Negative.    HENT: Negative.    Eyes: Negative.    Respiratory: Negative.    Cardiovascular: Negative.    Gastrointestinal: Negative.    Endocrine: Negative.    Genitourinary: Negative.    Musculoskeletal: Negative.    Skin: Negative.    Allergic/Immunologic: Negative.    Neurological: Negative.    Hematological: Negative.    Psychiatric/Behavioral: Negative for sleep disturbance and suicidal ideas. The patient is nervous/anxious.    All other systems reviewed and are negative.    Social History   Substance Use Topics   • Smoking status: Never Smoker   • Smokeless tobacco: Not on file   • Alcohol use No     Vitals:    06/20/18 1531   BP: 110/64   BP Location: Right arm   Patient Position: Sitting   Cuff Size: Adult   Pulse: 69   Resp: 16   Temp: 98.4 °F (36.9 °C)   TempSrc: Oral   SpO2: 97%   Weight: 64.4 kg (142 lb)   Height: 149.2 cm (58.75\")       Wt Readings from Last 3 Encounters:   06/20/18 64.4 kg (142 lb)   03/02/18 63.5 kg (140 lb)   12/15/17 60.8 kg (134 lb)       BP Readings from Last 3 Encounters:   06/20/18 110/64   03/02/18 102/68   12/15/17 112/72     Body mass index is 28.93 kg/m².  Allergies   Allergen Reactions   • Other      Dogs, Cats       Objective   Physical Exam   Constitutional: She is oriented to person, place, and time. Vital " signs are normal. She appears well-developed and well-nourished.   Neck: Trachea normal and phonation normal. Neck supple. No edema present.   Cardiovascular: Normal rate, regular rhythm, S1 normal, S2 normal, normal heart sounds and normal pulses.    Pulmonary/Chest: Effort normal and breath sounds normal.   Abdominal: Soft. Normal appearance and bowel sounds are normal. There is no hepatomegaly. There is no tenderness.   Neurological: She is alert and oriented to person, place, and time.   Skin: Skin is warm, dry and intact. Capillary refill takes less than 2 seconds.   Psychiatric: She has a normal mood and affect. Her speech is normal and behavior is normal. Judgment and thought content normal. Cognition and memory are normal.       Assessment/Plan   Rianna was seen today for anxiety.    Diagnoses and all orders for this visit:    Anxiety      Ms. Rianna Montano was seen in office today for her chronic and stable anxiety: Doing well with her sertraline 50 mg daily.  No refills required at this time.  I've asked Rianna to return to office in 6 months for reevaluation.  She voiced understanding.

## 2018-12-05 DIAGNOSIS — F41.9 ANXIETY: ICD-10-CM

## 2019-01-04 ENCOUNTER — OFFICE VISIT (OUTPATIENT)
Dept: FAMILY MEDICINE CLINIC | Facility: CLINIC | Age: 24
End: 2019-01-04

## 2019-01-04 VITALS
SYSTOLIC BLOOD PRESSURE: 130 MMHG | OXYGEN SATURATION: 97 % | RESPIRATION RATE: 16 BRPM | DIASTOLIC BLOOD PRESSURE: 78 MMHG | HEART RATE: 88 BPM | HEIGHT: 58 IN | WEIGHT: 149 LBS | BODY MASS INDEX: 31.28 KG/M2 | TEMPERATURE: 98.3 F

## 2019-01-04 DIAGNOSIS — Z23 NEED FOR INFLUENZA VACCINATION: ICD-10-CM

## 2019-01-04 DIAGNOSIS — F41.9 ANXIETY: Primary | ICD-10-CM

## 2019-01-04 PROCEDURE — 90674 CCIIV4 VAC NO PRSV 0.5 ML IM: CPT | Performed by: PHYSICIAN ASSISTANT

## 2019-01-04 PROCEDURE — 90471 IMMUNIZATION ADMIN: CPT | Performed by: PHYSICIAN ASSISTANT

## 2019-01-04 PROCEDURE — 99213 OFFICE O/P EST LOW 20 MIN: CPT | Performed by: PHYSICIAN ASSISTANT

## 2019-01-04 NOTE — PROGRESS NOTES
"Subjective   Rianna Montano is a 23 y.o. female presents for   Chief Complaint   Patient presents with   • Anxiety     management       History of Present Illness     Rianna is a 23-year-old female who presents for anxiety management.  She has been taking Sertraline 50 mg daily for her anxiety. She is doing well with the medication.  Denied any suicidal or homicidal thoughts.  Appetite and sleep has been good.  Doing well at .  She will graduate in May 2019. She would like to get the flu shot today.  Denied any fevers,chills or URI symptoms.      The following portions of the patient's history were reviewed and updated as appropriate: allergies, current medications, past family history, past medical history, past social history, past surgical history and problem list.    Review of Systems   Constitutional: Negative.    HENT: Negative.    Eyes: Negative.    Respiratory: Negative.    Cardiovascular: Negative.    Gastrointestinal: Negative.    Endocrine: Negative.    Genitourinary: Negative.    Musculoskeletal: Negative.    Skin: Negative.    Allergic/Immunologic: Negative.    Neurological: Negative.    Hematological: Negative.    Psychiatric/Behavioral: Negative for sleep disturbance and suicidal ideas. The patient is nervous/anxious.    All other systems reviewed and are negative.        Vitals:    01/04/19 1346   BP: 130/78   BP Location: Left arm   Patient Position: Sitting   Cuff Size: Adult   Pulse: 88   Resp: 16   Temp: 98.3 °F (36.8 °C)   TempSrc: Oral   SpO2: 97%   Weight: 67.6 kg (149 lb)   Height: 148 cm (58.25\")     Wt Readings from Last 3 Encounters:   01/04/19 67.6 kg (149 lb)   10/02/18 65.3 kg (144 lb)   06/20/18 64.4 kg (142 lb)       BP Readings from Last 3 Encounters:   01/04/19 130/78   10/02/18 126/73   06/20/18 110/64     Social History     Socioeconomic History   • Marital status: Single     Spouse name: Not on file   • Number of children: Not on file   • Years of education: Not on file   • " Highest education level: Not on file   Social Needs   • Financial resource strain: Not on file   • Food insecurity - worry: Not on file   • Food insecurity - inability: Not on file   • Transportation needs - medical: Not on file   • Transportation needs - non-medical: Not on file   Occupational History   • Not on file   Tobacco Use   • Smoking status: Never Smoker   Substance and Sexual Activity   • Alcohol use: No   • Drug use: No   • Sexual activity: Defer   Other Topics Concern   • Not on file   Social History Narrative   • Not on file       Allergies   Allergen Reactions   • Other      Dogs, Cats       Body mass index is 30.87 kg/m².    Objective   Physical Exam   Constitutional: She is oriented to person, place, and time. Vital signs are normal. She appears well-developed and well-nourished.   Neck: Trachea normal and phonation normal. Neck supple. No edema present.   Cardiovascular: Normal rate, regular rhythm, S1 normal, S2 normal, normal heart sounds and normal pulses.   Pulmonary/Chest: Effort normal and breath sounds normal.   Abdominal: Soft. Normal appearance, normal aorta and bowel sounds are normal. There is no hepatomegaly. There is no tenderness.   Neurological: She is alert and oriented to person, place, and time.   Skin: Skin is warm, dry and intact. Capillary refill takes less than 2 seconds.   Psychiatric: She has a normal mood and affect. Her speech is normal and behavior is normal. Judgment and thought content normal. Cognition and memory are normal.       Assessment/Plan   Rianna was seen today for anxiety.    Diagnoses and all orders for this visit:    Need for influenza vaccination  -     Flucelvax Quad=>4Years (Vial)      1.  Chronic and stable anxiety: Rianna is doing well with the sertraline 50 mg medication.  No refills required at this time.  She will return to office in 6 months for reevaluation.  2.  Need for influenza vaccination: Rianna has given written consent to receive the flu  immunization at office visit today.    Mirela Eisenberg PA-C    [facility]  [address] [facility] [id number]  Dept: [phone]  Dept Fax: [phone]  Loc: [phone]  Loc Fax: [phone]

## 2019-01-05 PROBLEM — Z23 NEED FOR INFLUENZA VACCINATION: Status: ACTIVE | Noted: 2019-01-05

## 2019-03-03 DIAGNOSIS — F41.9 ANXIETY: ICD-10-CM

## 2019-05-28 DIAGNOSIS — F41.9 ANXIETY: ICD-10-CM

## 2019-07-12 ENCOUNTER — OFFICE VISIT (OUTPATIENT)
Dept: FAMILY MEDICINE CLINIC | Facility: CLINIC | Age: 24
End: 2019-07-12

## 2019-07-12 VITALS
DIASTOLIC BLOOD PRESSURE: 78 MMHG | WEIGHT: 148 LBS | SYSTOLIC BLOOD PRESSURE: 118 MMHG | RESPIRATION RATE: 18 BRPM | TEMPERATURE: 97.9 F | HEIGHT: 58 IN | BODY MASS INDEX: 31.07 KG/M2 | OXYGEN SATURATION: 96 % | HEART RATE: 109 BPM

## 2019-07-12 DIAGNOSIS — F41.9 ANXIETY: Primary | ICD-10-CM

## 2019-07-12 PROCEDURE — 99212 OFFICE O/P EST SF 10 MIN: CPT | Performed by: PHYSICIAN ASSISTANT

## 2019-07-12 NOTE — PROGRESS NOTES
"Subjective   Rianna Montano is a 24 y.o. female presents for   Chief Complaint   Patient presents with   • Anxiety     managament       History of Present Illness     Rianna is a 24-year-old female who presents for anxiety management.  She has been doing well the Sertraline medication.  It has helped with her anxiety.  Denied any suicidal or homicidal thoughts.  Appetite and sleep has been normal.    The following portions of the patient's history were reviewed and updated as appropriate: allergies, current medications, past family history, past medical history, past social history, past surgical history and problem list.    Review of Systems   Constitutional: Negative.    HENT: Negative.    Eyes: Negative.    Respiratory: Negative.    Cardiovascular: Negative.    Gastrointestinal: Negative.    Endocrine: Negative.    Genitourinary: Negative.    Musculoskeletal: Negative.    Skin: Negative.    Allergic/Immunologic: Negative.    Neurological: Negative.    Hematological: Negative.    Psychiatric/Behavioral: Negative.  Negative for sleep disturbance and suicidal ideas.   All other systems reviewed and are negative.        Vitals:    07/12/19 1352   BP: 118/78   Pulse: 109   Resp: 18   Temp: 97.9 °F (36.6 °C)   SpO2: 96%   Weight: 67.1 kg (148 lb)   Height: 148 cm (58.27\")     Wt Readings from Last 3 Encounters:   07/12/19 67.1 kg (148 lb)   01/04/19 67.6 kg (149 lb)   10/02/18 65.3 kg (144 lb)     BP Readings from Last 3 Encounters:   07/12/19 118/78   01/04/19 130/78   10/02/18 126/73     Social History     Socioeconomic History   • Marital status: Single     Spouse name: Not on file   • Number of children: Not on file   • Years of education: Not on file   • Highest education level: Not on file   Tobacco Use   • Smoking status: Never Smoker   Substance and Sexual Activity   • Alcohol use: No   • Drug use: No   • Sexual activity: Defer       Allergies   Allergen Reactions   • Other      Dogs, Cats       Body mass " index is 30.65 kg/m².    Objective   Physical Exam   Constitutional: She is oriented to person, place, and time. Vital signs are normal. She appears well-developed and well-nourished.   Neck: Trachea normal and phonation normal. Neck supple. No edema present.   Cardiovascular: Normal rate, regular rhythm, S1 normal, S2 normal, normal heart sounds and normal pulses.   No murmur heard.  Pulmonary/Chest: Effort normal and breath sounds normal.   Abdominal: Soft. Normal appearance, normal aorta and bowel sounds are normal. There is no hepatomegaly. There is no tenderness.   Neurological: She is alert and oriented to person, place, and time.   Skin: Skin is warm, dry and intact. Capillary refill takes less than 2 seconds.   Psychiatric: She has a normal mood and affect. Her speech is normal and behavior is normal. Judgment and thought content normal. Cognition and memory are normal.       Assessment/Plan   Rianna was seen today for anxiety.    Diagnoses and all orders for this visit:    Anxiety    Ms. Rianna Montano was seen in office today for chronic and stable anxiety.  Doing well with the sertraline medication.  No refills are needed at this time.  So we will return to office in 6 months for reevaluation.      [facility]  [address] [place] [id number]  Dept: [phone]  Dept Fax: [phone]  Loc: [phone]  Loc Fax: [phone]

## 2019-08-25 DIAGNOSIS — F41.9 ANXIETY: ICD-10-CM

## 2019-09-06 ENCOUNTER — OFFICE VISIT (OUTPATIENT)
Dept: FAMILY MEDICINE CLINIC | Facility: CLINIC | Age: 24
End: 2019-09-06

## 2019-09-06 VITALS
SYSTOLIC BLOOD PRESSURE: 122 MMHG | HEART RATE: 102 BPM | BODY MASS INDEX: 31.7 KG/M2 | DIASTOLIC BLOOD PRESSURE: 78 MMHG | WEIGHT: 151 LBS | OXYGEN SATURATION: 95 % | HEIGHT: 58 IN | RESPIRATION RATE: 12 BRPM | TEMPERATURE: 97.8 F

## 2019-09-06 DIAGNOSIS — Z00.00 ANNUAL PHYSICAL EXAM: Primary | ICD-10-CM

## 2019-09-06 DIAGNOSIS — Z11.1 SCREENING EXAMINATION FOR PULMONARY TUBERCULOSIS: ICD-10-CM

## 2019-09-06 PROCEDURE — 99395 PREV VISIT EST AGE 18-39: CPT | Performed by: PHYSICIAN ASSISTANT

## 2019-09-06 NOTE — PROGRESS NOTES
"Subjective   Rianna Montano is a 24 y.o. female presents for   Chief Complaint   Patient presents with   • Annual Exam       History of Present Illness     Rianna is a 24 year old female who present for annual physical examination.  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  She needs physical examination with updated TB testing.  States she is feeling well at office visit today.  Denied any upper respiratory symptoms, chest pain, shortness of air, dizziness, vision changes, abdominal pain, urinary symptoms or swelling of ankles.  She did go to [place] and [place] this summer on a trip with her mother for 2 weeks.  Denied any night sweats or coughing.  Appetite and sleep have been normal.      The following portions of the patient's history were reviewed and updated as appropriate: allergies, current medications, past family history, past medical history, past social history, past surgical history and problem list.    Review of Systems   Constitutional: Negative.    HENT: Negative.    Eyes: Negative.    Respiratory: Negative.    Cardiovascular: Negative.    Gastrointestinal: Negative.    Endocrine: Negative.    Genitourinary: Negative.    Musculoskeletal: Negative.    Skin: Negative.    Allergic/Immunologic: Negative.    Neurological: Negative.    Hematological: Negative.    Psychiatric/Behavioral: Negative.    All other systems reviewed and are negative.        Vitals:    09/06/19 1501   BP: 122/78   Pulse: 102   Resp: 12   Temp: 97.8 °F (36.6 °C)   SpO2: 95%   Weight: 68.5 kg (151 lb)   Height: 147.3 cm (58\")     Wt Readings from Last 3 Encounters:   09/06/19 68.5 kg (151 lb)   07/12/19 67.1 kg (148 lb)   01/04/19 67.6 kg (149 lb)     BP Readings from Last 3 Encounters:   09/06/19 122/78   07/12/19 118/78   01/04/19 130/78     Social History     Socioeconomic History   • Marital status: Single     Spouse name: Not on file   • Number of children: Not on file   • Years of education: " Not on file   • Highest education level: Not on file   Tobacco Use   • Smoking status: Never Smoker   Substance and Sexual Activity   • Alcohol use: No   • Drug use: No   • Sexual activity: Defer       Allergies   Allergen Reactions   • Other      Dogs, Cats       Body mass index is 31.56 kg/m².    Objective   Physical Exam   Constitutional: She is oriented to person, place, and time. Vital signs are normal. She appears well-developed and well-nourished.   HENT:   Head: Normocephalic and atraumatic.   Right Ear: Hearing, tympanic membrane, external ear and ear canal normal.   Left Ear: Hearing, tympanic membrane, external ear and ear canal normal.   Nose: Nose normal. Right sinus exhibits no maxillary sinus tenderness and no frontal sinus tenderness. Left sinus exhibits no maxillary sinus tenderness and no frontal sinus tenderness.   Mouth/Throat: Uvula is midline, oropharynx is clear and moist and mucous membranes are normal.   Eyes: Conjunctivae, EOM and lids are normal. Pupils are equal, round, and reactive to light.   Neck: Trachea normal and phonation normal. Neck supple. No tracheal tenderness present. No tracheal deviation and no edema present. No thyroid mass and no thyromegaly present.   Cardiovascular: Normal rate, regular rhythm, S1 normal, S2 normal, normal heart sounds and normal pulses.   No murmur heard.  Pulmonary/Chest: Effort normal and breath sounds normal.   Abdominal: Soft. Normal appearance, normal aorta and bowel sounds are normal. There is no hepatomegaly. There is no tenderness.   Lymphadenopathy:     She has no cervical adenopathy.   Neurological: She is alert and oriented to person, place, and time.   Reflex Scores:       Brachioradialis reflexes are 2+ on the right side and 2+ on the left side.       Patellar reflexes are 2+ on the right side and 2+ on the left side.  Skin: Skin is warm, dry and intact. Capillary refill takes less than 2 seconds.   Psychiatric: She has a normal mood and  affect. Her speech is normal and behavior is normal. Judgment and thought content normal. Cognition and memory are normal.       Assessment/Plan   Rianna was seen today for annual exam.    Diagnoses and all orders for this visit:    Annual physical exam    Screening examination for pulmonary tuberculosis  -     QuantiFERON TB Gold      Ms. Rianna Montano was seen in office today for annual physical examination with screening TB testing.  Recently got hired by [employer].  She needs a physical with TB testing before she starts work.  She will have a QuantiFERON TB Gold test collected at office visit today.  She will be notified of test results when completed.  We will give her a copy of test results with her physical examination form when lab work is completed.  She voiced understanding.    [facility]  [address] [place] [id number]  Dept: [phone]  Dept Fax: [phone]  Loc: [phone]  Loc Fax: [phone]

## 2019-09-09 LAB
GAMMA INTERFERON BACKGROUND BLD IA-ACNC: 0.07 IU/ML
M TB IFN-G BLD-IMP: NEGATIVE
M TB IFN-G CD4+ BCKGRND COR BLD-ACNC: 0.35 IU/ML
MITOGEN IGNF BLD-ACNC: >10 IU/ML
QUANTIFERON INCUBATION: NORMAL
QUANTIFERON TB2 AG VALUE: 0.19 IU/ML
SERVICE CMNT-IMP: NORMAL

## 2019-11-15 ENCOUNTER — TELEPHONE (OUTPATIENT)
Dept: FAMILY MEDICINE CLINIC | Facility: CLINIC | Age: 24
End: 2019-11-15

## 2019-11-15 NOTE — TELEPHONE ENCOUNTER
Patient would like blood work for Thyroid and needs a Tetanus shot. Please let me know. Thank you

## 2019-11-15 NOTE — TELEPHONE ENCOUNTER
1.  What diagnosis is her symptoms that she having for thyroid testing?  2.  Okay for updated Tdap vaccination

## 2019-12-03 DIAGNOSIS — F41.9 ANXIETY: ICD-10-CM

## 2020-01-16 ENCOUNTER — OFFICE VISIT (OUTPATIENT)
Dept: FAMILY MEDICINE CLINIC | Facility: CLINIC | Age: 25
End: 2020-01-16

## 2020-01-16 VITALS
BODY MASS INDEX: 32.32 KG/M2 | SYSTOLIC BLOOD PRESSURE: 110 MMHG | DIASTOLIC BLOOD PRESSURE: 68 MMHG | WEIGHT: 154 LBS | HEART RATE: 60 BPM | OXYGEN SATURATION: 98 % | RESPIRATION RATE: 16 BRPM | TEMPERATURE: 98.2 F | HEIGHT: 58 IN

## 2020-01-16 DIAGNOSIS — Z23 NEED FOR TETANUS, DIPHTHERIA, AND ACELLULAR PERTUSSIS (TDAP) VACCINE: ICD-10-CM

## 2020-01-16 DIAGNOSIS — F41.9 ANXIETY: Primary | ICD-10-CM

## 2020-01-16 PROCEDURE — 99213 OFFICE O/P EST LOW 20 MIN: CPT | Performed by: PHYSICIAN ASSISTANT

## 2020-01-16 PROCEDURE — 90715 TDAP VACCINE 7 YRS/> IM: CPT | Performed by: PHYSICIAN ASSISTANT

## 2020-01-16 PROCEDURE — 90471 IMMUNIZATION ADMIN: CPT | Performed by: PHYSICIAN ASSISTANT

## 2020-01-16 NOTE — PROGRESS NOTES
"Subjective   Rianna Montano is a 24 y.o. female presents for   Chief Complaint   Patient presents with   • Anxiety     management       History of Present Illness   Rianna is a 24-year-old female who presents for anxiety management.  Currently taking sertraline 50 mg daily.  She has gained 3 pounds since September 6, 2019.  States she is doing well with the sertraline 50 mg medication.  Denied any suicidal or homicidal ideation.  Appetite and sleep have been normal.  She would like to have updated Tdap vaccination.  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  Denied any fevers, chills, or upper respiratory symptoms.  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      The following portions of the patient's history were reviewed and updated as appropriate: allergies, current medications, past family history, past medical history, past social history, past surgical history and problem list.    Review of Systems   Constitutional: Negative.  Negative for chills, fatigue and fever.   HENT: Negative.    Eyes: Negative.    Respiratory: Negative.    Cardiovascular: Negative.    Gastrointestinal: Negative.    Endocrine: Negative.    Genitourinary: Negative.    Musculoskeletal: Negative.    Skin: Negative.    Allergic/Immunologic: Negative.    Neurological: Negative.    Hematological: Negative.    Psychiatric/Behavioral: Negative.  Negative for sleep disturbance and suicidal ideas.   All other systems reviewed and are negative.        Vitals:    01/16/20 1610   BP: 110/68   Pulse: 60   Resp: 16   Temp: 98.2 °F (36.8 °C)   SpO2: 98%   Weight: 69.9 kg (154 lb)   Height: 147.3 cm (58\")     Wt Readings from Last 3 Encounters:   01/16/20 69.9 kg (154 lb)   09/06/19 68.5 kg (151 lb)   07/12/19 67.1 kg (148 lb)     BP Readings from Last 3 Encounters:   01/16/20 110/68   09/06/19 122/78   07/12/19 118/78     Social History     Socioeconomic History   • Marital " status: Single     Spouse name: Not on file   • Number of children: Not on file   • Years of education: Not on file   • Highest education level: Not on file   Tobacco Use   • Smoking status: Never Smoker   Substance and Sexual Activity   • Alcohol use: No   • Drug use: No   • Sexual activity: Defer       Allergies   Allergen Reactions   • Other      Dogs, Cats       Body mass index is 32.19 kg/m².    Objective   Physical Exam   Constitutional: She is oriented to person, place, and time. Vital signs are normal. She appears well-developed and well-nourished.   Obese female   Neck: Trachea normal and phonation normal. Neck supple. No tracheal tenderness present. No tracheal deviation and no edema present. No thyromegaly present.   Cardiovascular: Normal rate, regular rhythm, S1 normal, S2 normal, normal heart sounds and normal pulses.   No murmur heard.  Pulmonary/Chest: Effort normal and breath sounds normal.   Abdominal: Soft. Normal appearance, normal aorta and bowel sounds are normal. There is no hepatomegaly. There is no tenderness.   Neurological: She is alert and oriented to person, place, and time.   Skin: Skin is warm, dry and intact. Capillary refill takes less than 2 seconds.   Psychiatric: She has a normal mood and affect. Her speech is normal and behavior is normal. Judgment and thought content normal. Cognition and memory are normal.       Assessment/Plan   Rianna was seen today for anxiety.    Diagnoses and all orders for this visit:    Anxiety    Need for tetanus, diphtheria, and acellular pertussis (Tdap) vaccine  -     Tdap Vaccine Greater Than or Equal To 6yo IM    1.  Chronic and stable anxiety: Doing well with her sertraline medication.  No refills are needed at this time.  Plan to follow-up in 6 months.  2.  Need for updated Tdap vaccination: Rianna has given written consent to receive updated Tdap vaccination.      KATYA Harrington PC [facility]  [facility]  [address] [facility] [address]  Dept: [phone]  Dept Fax: [phone]  Loc: [phone]  Loc Fax: [phone]

## 2020-01-17 NOTE — PROGRESS NOTES
I have reviewed the notes, assessments, and/or procedures performed by Mirela HENNESSY, I concur with her/his documentation of Rianna Montano.

## 2020-04-22 DIAGNOSIS — F41.9 ANXIETY: ICD-10-CM

## 2020-07-17 ENCOUNTER — OFFICE VISIT (OUTPATIENT)
Dept: FAMILY MEDICINE CLINIC | Facility: CLINIC | Age: 25
End: 2020-07-17

## 2020-07-17 VITALS
WEIGHT: 127 LBS | SYSTOLIC BLOOD PRESSURE: 118 MMHG | HEART RATE: 69 BPM | TEMPERATURE: 98.2 F | BODY MASS INDEX: 26.66 KG/M2 | DIASTOLIC BLOOD PRESSURE: 80 MMHG | OXYGEN SATURATION: 99 % | HEIGHT: 58 IN

## 2020-07-17 DIAGNOSIS — F41.9 ANXIETY: Primary | ICD-10-CM

## 2020-07-17 PROCEDURE — 99213 OFFICE O/P EST LOW 20 MIN: CPT | Performed by: PHYSICIAN ASSISTANT

## 2020-07-17 NOTE — PROGRESS NOTES
"Subjective   Rianna Montano is a 25 y.o. female presents for   Chief Complaint   Patient presents with   • Anxiety     followup meds       History of Present Illness     Rianna is a 25-year-old female who presents for anxiety management.  She has been taking sertraline 50 mg daily.  She has lost 27 pounds since January 16, 2020 .  States she has been dieting and exercising for weight loss.  She has been very active.  Diet has been decrease carbohydrates and smaller portion sizes.  Sleep has been normal.  Rianna has been doing well with the sertraline medication.  States she takes this as directed.  Helps with her anxiety symptoms.  Denied any suicidal or homicidal ideation.  She has no complaints today.  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    The following portions of the patient's history were reviewed and updated as appropriate: allergies, current medications, past family history, past medical history, past social history, past surgical history and problem list.    Review of Systems   Constitutional: Negative.    HENT: Negative.    Eyes: Negative.    Respiratory: Negative.  Negative for cough, chest tightness, shortness of breath and wheezing.    Cardiovascular: Negative.  Negative for chest pain, palpitations and leg swelling.   Gastrointestinal: Negative.    Endocrine: Negative.    Genitourinary: Negative.    Musculoskeletal: Negative.    Skin: Negative.    Allergic/Immunologic: Negative.    Neurological: Negative.    Hematological: Negative.    Psychiatric/Behavioral: Negative.  Negative for sleep disturbance and suicidal ideas.   All other systems reviewed and are negative.        Vitals:    07/17/20 1333   BP: 118/80   Pulse: 69   Temp: 98.2 °F (36.8 °C)   SpO2: 99%   Weight: 57.6 kg (127 lb)   Height: 147.3 cm (58\")     Wt Readings from Last 3 Encounters:   07/17/20 57.6 kg (127 lb)   01/16/20 69.9 kg (154 lb)   09/06/19 68.5 kg " (151 lb)     BP Readings from Last 3 Encounters:   07/17/20 118/80   01/16/20 110/68   09/06/19 122/78     Social History     Socioeconomic History   • Marital status: Single     Spouse name: Not on file   • Number of children: Not on file   • Years of education: Not on file   • Highest education level: Not on file   Tobacco Use   • Smoking status: Never Smoker   • Smokeless tobacco: Never Used   Substance and Sexual Activity   • Alcohol use: No   • Drug use: No   • Sexual activity: Defer       Allergies   Allergen Reactions   • Other Itching     Dogs, Cats       Body mass index is 26.54 kg/m².    Objective   Physical Exam   Constitutional: She is oriented to person, place, and time. Vital signs are normal. She appears well-developed and well-nourished.   Overweight female sitting on exam table wearing facial mask   HENT:   Head: Normocephalic and atraumatic.   Neck: Trachea normal and phonation normal. Neck supple. No tracheal tenderness present. No tracheal deviation and no edema present. No thyroid mass and no thyromegaly present.   Cardiovascular: Normal rate, regular rhythm, S1 normal, S2 normal, normal heart sounds and normal pulses.   No murmur heard.  Pulmonary/Chest: Effort normal and breath sounds normal.   Abdominal: Soft. Normal appearance, normal aorta and bowel sounds are normal. There is no hepatomegaly. There is no tenderness.   Neurological: She is alert and oriented to person, place, and time.   Skin: Skin is warm, dry and intact. Capillary refill takes less than 2 seconds.   Psychiatric: She has a normal mood and affect. Her speech is normal and behavior is normal. Judgment and thought content normal. Cognition and memory are normal.      I was wearing surgical mask during the entire office visit encounter.      Assessment/Plan   Rianna was seen today for anxiety.    Diagnoses and all orders for this visit:    Anxiety  -     sertraline (ZOLOFT) 50 MG tablet; Take 1 tablet by mouth Daily.    Ms.  Rianna Montano was seen in office today for chronic and stable anxiety: She has been doing well with her sertraline 50 mg daily.  I have sent a refill to her mail order pharmacy.  Plan to follow-up in 6 months.      KATYA Harrington [facility]  [address] [place] [id number]  Dept: [phone]  Dept Fax: [phone]  Loc: [phone]  Loc Fax: [phone]             Answers for HPI/ROS submitted by the patient on 7/15/2020   What is the primary reason for your visit?: Other  Please describe your symptoms.: Just my anxiety medication check up  Have you had these symptoms before?: Yes  How long have you been having these symptoms?: Greater than 2 weeks  Please list any medications you are currently taking for this condition.: Zoloft

## 2021-01-18 ENCOUNTER — TELEMEDICINE (OUTPATIENT)
Dept: FAMILY MEDICINE CLINIC | Facility: CLINIC | Age: 26
End: 2021-01-18

## 2021-01-18 DIAGNOSIS — F41.9 ANXIETY: Primary | ICD-10-CM

## 2021-01-18 PROCEDURE — 99213 OFFICE O/P EST LOW 20 MIN: CPT | Performed by: PHYSICIAN ASSISTANT

## 2021-01-18 NOTE — PROGRESS NOTES
Subjective   Rianna Montano is a 25 y.o. female presents for   Chief Complaint   Patient presents with   • Anxiety     management       History of Present Illness     Rianna is a 25-year-old female who presents using [employer] video for generalized anxiety management.  Doing well with her sertraline medication.  States medication helps with her anxiety issues.  Denied any suicidal or homicidal ideation.  Has good family support at home.  Currently a teacher at local middle school.  Sleep has been normal.  Diet has been healthy.  She has been exercising regularly.  No other complaints today.    The following portions of the patient's history were reviewed and updated as appropriate: allergies, current medications, past family history, past medical history, past social history, past surgical history and problem list.    Review of Systems   Constitutional: Negative.    HENT: Negative.    Eyes: Negative.    Respiratory: Negative.    Cardiovascular: Negative.    Gastrointestinal: Negative.    Endocrine: Negative.    Genitourinary: Negative.    Musculoskeletal: Negative.    Skin: Negative.    Allergic/Immunologic: Negative.    Neurological: Negative.    Hematological: Negative.    Psychiatric/Behavioral: Negative.  Negative for sleep disturbance and suicidal ideas.   All other systems reviewed and are negative.    No vital signs were obtained due to my chart video encounter.    There were no vitals filed for this visit.  Wt Readings from Last 3 Encounters:   07/17/20 57.6 kg (127 lb)   01/16/20 69.9 kg (154 lb)   09/06/19 68.5 kg (151 lb)     BP Readings from Last 3 Encounters:   07/17/20 118/80   01/16/20 110/68   09/06/19 122/78     Social History     Socioeconomic History   • Marital status: Single     Spouse name: Not on file   • Number of children: Not on file   • Years of education: Not on file   • Highest education level: Not on file   Tobacco Use   • Smoking status: Never Smoker   • Smokeless tobacco: Never Used    Substance and Sexual Activity   • Alcohol use: No   • Drug use: No   • Sexual activity: Defer       Allergies   Allergen Reactions   • Other Itching     Dogs, Cats       There is no height or weight on file to calculate BMI.    Objective   Physical Exam  Constitutional:       Appearance: Normal appearance. She is well-developed and well-groomed.   HENT:      Head: Normocephalic and atraumatic.      Right Ear: Hearing normal.      Left Ear: Hearing normal.   Pulmonary:      Effort: Pulmonary effort is normal.      Breath sounds: Normal breath sounds.   Neurological:      Mental Status: She is alert and oriented to person, place, and time.   Psychiatric:         Attention and Perception: Attention and perception normal.         Mood and Affect: Mood and affect normal.         Speech: Speech normal.         Behavior: Behavior is cooperative.         Thought Content: Thought content normal.         Cognition and Memory: Cognition and memory normal.         Judgment: Judgment normal.     Physical exam was limited due to MyChart video encounter.    Assessment/Plan   Diagnoses and all orders for this visit:    1. Anxiety (Primary)  -     sertraline (ZOLOFT) 50 MG tablet; Take 1 tablet by mouth Daily.  Dispense: 90 tablet; Refill: 3      Ms. Rianna Montano was seen using [employer] video encounter today for generalized anxiety disorder management.  Doing well with her sertraline medication.  I have sent a refill to pharmacy.  Plan to follow-up in 6 months.    I spent approximately 12-14 minutes via [employer] video discussing her anxiety and medication.  KATYA Harrington [facility]  [address] [place] [id number]  Dept: [phone]  Dept Fax: [phone]  Loc: [phone]  Loc Fax: [phone]           No visits with results within 2 Week(s) from this visit.   Latest known visit with results is:   Office Visit on 09/06/2019   Component Date  Value Ref Range Status   • QuantiFERON Incubation 09/06/2019 Incubation performed.   Final   • QuantiFERON Criteria 09/06/2019 Comment   Final    Comment: The QuantiFERON-TB Gold Plus result is determined by subtracting  the Nil value from either TB antigen (Ag) tube. The mitogen tube  serves as a control for the test.     • QUANTIFERON TB1 AG VALUE 09/06/2019 0.35  IU/mL Final   • QUANTIFERON TB2 AG VALUE 09/06/2019 0.19  IU/mL Final   • QuantiFERON Nil Value 09/06/2019 0.07  IU/mL Final   • QuantiFERON Mitogen Value 09/06/2019 >10.00  IU/mL Final   • QUANTIFERON-TB GOLD PLUS 09/06/2019 Negative  Negative Final

## 2021-10-21 ENCOUNTER — OFFICE VISIT (OUTPATIENT)
Dept: FAMILY MEDICINE CLINIC | Facility: CLINIC | Age: 26
End: 2021-10-21

## 2021-10-21 VITALS
HEIGHT: 59 IN | DIASTOLIC BLOOD PRESSURE: 80 MMHG | WEIGHT: 138 LBS | BODY MASS INDEX: 27.82 KG/M2 | RESPIRATION RATE: 14 BRPM | OXYGEN SATURATION: 98 % | HEART RATE: 81 BPM | SYSTOLIC BLOOD PRESSURE: 120 MMHG | TEMPERATURE: 97.1 F

## 2021-10-21 DIAGNOSIS — L23.9 ALLERGIC DERMATITIS: Primary | ICD-10-CM

## 2021-10-21 PROCEDURE — 99213 OFFICE O/P EST LOW 20 MIN: CPT | Performed by: PHYSICIAN ASSISTANT

## 2021-10-21 RX ORDER — TRIAMCINOLONE ACETONIDE 0.25 MG/G
1 OINTMENT TOPICAL 2 TIMES DAILY
Qty: 15 G | Refills: 0 | Status: SHIPPED | OUTPATIENT
Start: 2021-10-21 | End: 2022-06-08

## 2021-10-21 NOTE — PROGRESS NOTES
"Chief Complaint  Rash    Subjective          Rianna Montano presents to [facility]  History of Present Illness  Rianna 26-year-old female who presents with itchy rash above the antecubital area. States she thinks she had a real allergic reaction to something.  She has high whelps in area.  This has been occurring for the past week.  Denied any change in medications, detergents, lotions, soaps or foods.  Has not been doing yard work.  Has not used any topical medication except lotions.  Appetite and sleep have been normal.  LMP was October 13, 2021.     Objective   Vital Signs:   /80   Pulse 81   Temp 97.1 °F (36.2 °C)   Resp 14   Ht 149.2 cm (58.75\")   Wt 62.6 kg (138 lb)   SpO2 98%   BMI 28.11 kg/m²     Physical Exam  Vitals and nursing note reviewed.   Constitutional:       Appearance: Normal appearance. She is well-developed, well-groomed and overweight.      Interventions: Face mask in place.   Skin:     Findings: Rash present. Rash is urticarial.          Neurological:      Mental Status: She is alert and oriented to person, place, and time.   Psychiatric:         Attention and Perception: Attention normal.         Mood and Affect: Mood and affect normal.         Speech: Speech normal.         Behavior: Behavior normal. Behavior is cooperative.         Thought Content: Thought content normal.         Cognition and Memory: Cognition and memory normal.     I was wearing a surgical mask and face shield during the entire office visit/encounter.     Result Review :                 Assessment and Plan    Diagnoses and all orders for this visit:    1. Allergic dermatitis (Primary)  -     triamcinolone (KENALOG) 0.025 % ointment; Apply 1 application topically to the appropriate area as directed 2 (Two) Times a Day.  Dispense: 15 g; Refill: 0    Rianna was  seen in office today with allergic dermatitis.  Have sent to pharmacy triamcinolone ointment to use twice daily for " the next 7-10 days.  May use over-the-counter antihistamines as well.  Will return to office if no improvement.    Follow Up   Return if symptoms worsen or fail to improve, for Annual.  Patient was given instructions and counseling regarding her condition or for health maintenance advice. Please see specific information pulled into the AVS if appropriate.     KATYA Harrington PC [facility]  [address] [place] [phone]  Dept: [phone]  Dept Fax: [phone]  Loc: [phone]  Loc Fax: [phone]

## 2022-02-20 DIAGNOSIS — F41.9 ANXIETY: ICD-10-CM

## 2022-05-04 ENCOUNTER — OFFICE VISIT (OUTPATIENT)
Dept: FAMILY MEDICINE CLINIC | Facility: CLINIC | Age: 27
End: 2022-05-04

## 2022-05-04 VITALS — HEIGHT: 59 IN | TEMPERATURE: 98.4 F | OXYGEN SATURATION: 98 % | HEART RATE: 113 BPM | BODY MASS INDEX: 28.11 KG/M2

## 2022-05-04 DIAGNOSIS — M79.10 MYALGIA: ICD-10-CM

## 2022-05-04 DIAGNOSIS — R05.9 COUGH: ICD-10-CM

## 2022-05-04 DIAGNOSIS — J02.9 PHARYNGITIS, UNSPECIFIED ETIOLOGY: Primary | ICD-10-CM

## 2022-05-04 DIAGNOSIS — R07.89 CHEST PRESSURE: ICD-10-CM

## 2022-05-04 LAB
EXPIRATION DATE: NORMAL
EXPIRATION DATE: NORMAL
FLUAV AG NPH QL: NEGATIVE
FLUBV AG NPH QL: NEGATIVE
INTERNAL CONTROL: NORMAL
INTERNAL CONTROL: NORMAL
Lab: NORMAL
Lab: NORMAL
S PYO AG THROAT QL: NEGATIVE

## 2022-05-04 PROCEDURE — 87880 STREP A ASSAY W/OPTIC: CPT | Performed by: PHYSICIAN ASSISTANT

## 2022-05-04 PROCEDURE — 87804 INFLUENZA ASSAY W/OPTIC: CPT | Performed by: PHYSICIAN ASSISTANT

## 2022-05-04 PROCEDURE — 99214 OFFICE O/P EST MOD 30 MIN: CPT | Performed by: PHYSICIAN ASSISTANT

## 2022-05-04 NOTE — PROGRESS NOTES
"Chief Complaint  Sore Throat, Nasal Congestion (drainage), Cough, and Earache (both)    Subjective          Rianna Montano presents to [facility]  History of Present Illness  Rianna is a 27-year-old female who presents with new onset slight green productive cough, sore throat, chest congestion, head congestion, sinus pressure, bilateral ear pain, slight green rhinorrhea, headache, and chills for the past 2 days.  Rianna has had chills off and on.  States she took a home COVID test this morning which was negative.  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  Has had her COVID immunization/booster and flu shot.  Appetite and sleep have been normal.  Has used over-the-counter nasal spray without relief of symptoms.  Appetite and sleep have been normal.  Denied any wheezing, shortness of breath, nausea, vomiting, diarrhea, fevers or loss of taste or smell.  Review of Systems   Constitutional: Positive for chills.   HENT: Positive for congestion, sinus pressure and sore throat.    Respiratory: Positive for cough.    Musculoskeletal: Positive for myalgias.   All other systems reviewed and are negative.    Objective   Vital Signs:   Pulse 113   Temp 98.4 °F (36.9 °C)   Ht 149.2 cm (58.75\")   SpO2 98%   BMI 28.11 kg/m²    Patient's last menstrual period was 04/25/2022.     Physical Exam  Vitals and nursing note reviewed.   Constitutional:       Appearance: Normal appearance. She is well-developed and well-groomed.      Interventions: Face mask in place.   HENT:      Head: Normocephalic and atraumatic.      Jaw: There is normal jaw occlusion.      Right Ear: Hearing, ear canal and external ear normal. Tympanic membrane is bulging.      Left Ear: Hearing, ear canal and external ear normal. Tympanic membrane is bulging.      Nose: Nose normal.      Right Sinus: No maxillary sinus tenderness or frontal sinus tenderness.      Left Sinus: No maxillary " sinus tenderness or frontal sinus tenderness.      Mouth/Throat:      Lips: Pink.      Mouth: Mucous membranes are moist.      Dentition: Normal dentition.      Tongue: No lesions.      Pharynx: Oropharynx is clear. Uvula midline.      Tonsils: No tonsillar exudate.   Eyes:      General: Lids are normal.      Conjunctiva/sclera: Conjunctivae normal.      Pupils: Pupils are equal, round, and reactive to light.   Neck:      Trachea: Trachea and phonation normal. No tracheal tenderness.   Cardiovascular:      Rate and Rhythm: Normal rate and regular rhythm.      Pulses: Normal pulses.      Heart sounds: Normal heart sounds, S1 normal and S2 normal. No murmur heard.  Pulmonary:      Effort: Pulmonary effort is normal.      Breath sounds: Normal breath sounds and air entry.   Abdominal:      General: Bowel sounds are normal.      Palpations: Abdomen is soft.      Tenderness: There is no abdominal tenderness. There is no right CVA tenderness, left CVA tenderness, guarding or rebound. Negative signs include Gallego's sign, Rovsing's sign, McBurney's sign, psoas sign and obturator sign.   Musculoskeletal:      Cervical back: Neck supple.      Right lower leg: No edema.      Left lower leg: No edema.   Lymphadenopathy:      Cervical: No cervical adenopathy.      Right cervical: No superficial, deep or posterior cervical adenopathy.     Left cervical: No superficial, deep or posterior cervical adenopathy.   Skin:     General: Skin is warm and dry.      Capillary Refill: Capillary refill takes less than 2 seconds.   Neurological:      Mental Status: She is alert and oriented to person, place, and time.   Psychiatric:         Attention and Perception: Attention and perception normal.         Mood and Affect: Mood and affect normal.         Speech: Speech normal.         Behavior: Behavior is cooperative.         Thought Content: Thought content normal.         Cognition and Memory: Cognition and memory normal.         Judgment:  Judgment normal.     Patient was wearing a mask when I enter room.  I enter the room wearing hair covering, face shield, N 95 mask and gown/gloves.  Appropriate PPE was worn by medical assistant and myself during the office encounter.  I washed hands thoroughly after leaving the patient's room after removal of PPE.     Result Review :        Results for orders placed or performed in visit on 05/04/22   POC Rapid Strep A    Specimen: Swab   Result Value Ref Range    Rapid Strep A Screen Negative Negative, VALID, INVALID, Not Performed    Internal Control Passed Passed    Lot Number [id number]     Expiration Date 05/31/22    POC Influenza A / B    Specimen: Swab   Result Value Ref Range    Rapid Influenza A Ag Negative Negative    Rapid Influenza B Ag Negative Negative    Internal Control Passed Passed    Lot Number [id number]     Expiration Date 12/09/22                  Assessment and Plan    Diagnoses and all orders for this visit:    1. Pharyngitis, unspecified etiology (Primary)  -     POC Rapid Strep A  -     QUESTIONNAIRE SERIES    2. Cough  -     POC Influenza A / B  -     COVID-19,[facility] ROUTINE, NP/OP SWAB IN TRANSPORT MEDIA OR ESWAB 72 HR TAT - Swab, Nasopharynx  -     QUESTIONNAIRE SERIES    3. Chest pressure  -     POC Influenza A / B  -     COVID-19,[facility] ROUTINE, NP/OP SWAB IN TRANSPORT MEDIA OR ESWAB 72 HR TAT - Swab, Nasopharynx  -     QUESTIONNAIRE SERIES    4. Myalgia  -     POC Influenza A / B  -     COVID-19,[facility] ROUTINE, NP/OP SWAB IN TRANSPORT MEDIA OR ESWAB 72 HR TAT - Swab, Nasopharynx  -     QUESTIONNAIRE SERIES    Sara was seen in office today with new pharyngitis, cough, chest pressure and myalgias.  In office flu and strep test were negative.  COVID-19 PCR test was sent to the laboratory for further evaluation.  Instructed to quarantine at home until test results is completed.  May continue using over-the-counter allergy medications for now.  Have given her a work excuse to return  on Friday, May 6, 2022.    Follow Up   Return in about 2 months (around 7/4/2022).  Patient was given instructions and counseling regarding her condition or for health maintenance advice. Please see specific information pulled into the AVS if appropriate.     [facility]  [address] [place] [id number]  Dept: [phone]  Dept Fax: [phone]  Loc: [phone]  Loc Fax: [phone]

## 2022-05-05 LAB — SARS-COV-2 RNA RESP QL NAA+PROBE: NOT DETECTED

## 2022-05-13 ENCOUNTER — HOSPITAL ENCOUNTER (OUTPATIENT)
Dept: GENERAL RADIOLOGY | Facility: HOSPITAL | Age: 27
Discharge: HOME | End: 2022-05-13
Admitting: PHYSICIAN ASSISTANT

## 2022-05-13 DIAGNOSIS — R05.9 COUGH: ICD-10-CM

## 2022-05-13 DIAGNOSIS — R05.9 COUGH: Primary | ICD-10-CM

## 2022-05-13 PROCEDURE — 71046 X-RAY EXAM CHEST 2 VIEWS: CPT

## 2022-05-14 DIAGNOSIS — R05.9 COUGH: Primary | ICD-10-CM

## 2022-05-14 RX ORDER — DEXTROMETHORPHAN HYDROBROMIDE AND PROMETHAZINE HYDROCHLORIDE 15; 6.25 MG/5ML; MG/5ML
5 SYRUP ORAL 4 TIMES DAILY PRN
Qty: 118 ML | Refills: 0 | Status: SHIPPED | OUTPATIENT
Start: 2022-05-14 | End: 2022-06-08

## 2022-05-14 RX ORDER — AZITHROMYCIN 250 MG/1
TABLET, FILM COATED ORAL
Qty: 6 TABLET | Refills: 0 | Status: SHIPPED | OUTPATIENT
Start: 2022-05-14 | End: 2022-06-08

## 2022-06-08 ENCOUNTER — OFFICE VISIT (OUTPATIENT)
Dept: FAMILY MEDICINE CLINIC | Facility: CLINIC | Age: 27
End: 2022-06-08

## 2022-06-08 VITALS
SYSTOLIC BLOOD PRESSURE: 120 MMHG | BODY MASS INDEX: 29.64 KG/M2 | OXYGEN SATURATION: 99 % | HEART RATE: 93 BPM | DIASTOLIC BLOOD PRESSURE: 70 MMHG | RESPIRATION RATE: 14 BRPM | WEIGHT: 147 LBS | HEIGHT: 59 IN | TEMPERATURE: 98.2 F

## 2022-06-08 DIAGNOSIS — E04.9 THYROID ENLARGEMENT: Primary | ICD-10-CM

## 2022-06-08 PROCEDURE — 99213 OFFICE O/P EST LOW 20 MIN: CPT | Performed by: PHYSICIAN ASSISTANT

## 2022-06-08 RX ORDER — CICLOPIROX 1 G/100ML
SHAMPOO TOPICAL
COMMUNITY
Start: 2022-05-17

## 2022-06-08 NOTE — PROGRESS NOTES
"Chief Complaint  Thyroid Problem (Dentist suggested have checked. Said it looked swollen)    Subjective          Rianna Montano presents to [facility]  History of Present Illness  Rianna is a 27 year old female who presents for possible thyroid enlargement.  She saw her dentist approximately 2 months ago.  They noticed that her thyroid appeared to be swollen.  There is family history of thyroid issues with her aunt and grandmother mother.  She has been a little fatigued over the past several months.  Has also gained weight.  Has gained 9 pounds since last office visit in October 2021.  She is on the Depo shot for birth control.  Appetite and sleep have been normal.  Review of Systems   Constitutional: Positive for fatigue.   All other systems reviewed and are negative.    Objective   Vital Signs:   /70   Pulse 93   Temp 98.2 °F (36.8 °C)   Resp 14   Ht 149.2 cm (58.75\")   Wt 66.7 kg (147 lb)   SpO2 99%   BMI 29.94 kg/m²     Physical Exam  Vitals and nursing note reviewed.   Constitutional:       Appearance: Normal appearance. She is well-developed and well-groomed. She is obese.      Interventions: Face mask in place.   HENT:      Head: Normocephalic and atraumatic.   Neck:      Thyroid: Thyromegaly (slight) present. No thyroid mass or thyroid tenderness.      Trachea: Trachea and phonation normal. No tracheal tenderness.   Cardiovascular:      Rate and Rhythm: Normal rate and regular rhythm.      Pulses: Normal pulses.      Heart sounds: Normal heart sounds, S1 normal and S2 normal. No murmur heard.  Pulmonary:      Effort: Pulmonary effort is normal.      Breath sounds: Normal breath sounds and air entry.   Abdominal:      General: Bowel sounds are normal.      Palpations: Abdomen is soft. There is no hepatomegaly.      Tenderness: There is no abdominal tenderness. There is no right CVA tenderness, left CVA tenderness, guarding or rebound. Negative signs include " Gallego's sign, Rovsing's sign, McBurney's sign, psoas sign and obturator sign.   Musculoskeletal:      Cervical back: Neck supple.      Right lower leg: No edema.      Left lower leg: No edema.   Skin:     General: Skin is warm and dry.      Capillary Refill: Capillary refill takes less than 2 seconds.   Neurological:      Mental Status: She is alert and oriented to person, place, and time.   Psychiatric:         Attention and Perception: Attention and perception normal.         Mood and Affect: Mood and affect normal.         Speech: Speech normal.         Behavior: Behavior normal. Behavior is cooperative.         Thought Content: Thought content normal.         Cognition and Memory: Cognition and memory normal.         Judgment: Judgment normal.     I wore a facial mask, face shield, and gloves during this patient encounter.  Patient also wearing a surgical mask.  Hand hygiene performed before and after seeing the patient.     Result Review :                 Assessment and Plan    Diagnoses and all orders for this visit:    1. Thyroid enlargement (Primary)  -     Thyroid Panel With TSH  -     US Thyroid; Future    Rianna was seen in office today for thyroid enlargement.  I  will proceed with thyroid ultrasound.  She will have a thyroid profile and TSH collected today.  Will be notified of test results when completed.    Follow Up   Return if symptoms worsen or fail to improve.  Patient was given instructions and counseling regarding her condition or for health maintenance advice. Please see specific information pulled into the AVS if appropriate.     KATYA Harrington [facility]  [address] [place] [id number]  Dept: [phone]  Dept Fax: [phone]  Loc: [phone]  Loc Fax: [phone]        Answers for HPI/ROS submitted by the patient on 6/6/2022  Please describe your symptoms.: My dentist told me to get my thyroid checked  out because it seemed swollen  Have you had these symptoms before?: No  How long have you been having these symptoms?: Greater than 2 weeks  Please list any medications you are currently taking for this condition.: None  Please describe any probable cause for these symptoms. : Genetics  What is the primary reason for your visit?: Other

## 2022-06-09 LAB
FT4I SERPL CALC-MCNC: 1.6 (ref 1.2–4.9)
T3RU NFR SERPL: 26 % (ref 24–39)
T4 SERPL-MCNC: 6.2 UG/DL (ref 4.5–12)
TSH SERPL DL<=0.005 MIU/L-ACNC: 1.86 UIU/ML (ref 0.45–4.5)

## 2022-07-01 ENCOUNTER — OFFICE VISIT (OUTPATIENT)
Dept: FAMILY MEDICINE CLINIC | Facility: CLINIC | Age: 27
End: 2022-07-01

## 2022-07-01 VITALS
TEMPERATURE: 98.2 F | RESPIRATION RATE: 15 BRPM | WEIGHT: 148 LBS | SYSTOLIC BLOOD PRESSURE: 124 MMHG | BODY MASS INDEX: 29.84 KG/M2 | DIASTOLIC BLOOD PRESSURE: 80 MMHG | HEART RATE: 89 BPM | OXYGEN SATURATION: 98 % | HEIGHT: 59 IN

## 2022-07-01 DIAGNOSIS — E78.00 HYPERCHOLESTEROLEMIA: ICD-10-CM

## 2022-07-01 DIAGNOSIS — Z00.00 ANNUAL PHYSICAL EXAM: Primary | ICD-10-CM

## 2022-07-01 DIAGNOSIS — F41.1 GAD (GENERALIZED ANXIETY DISORDER): ICD-10-CM

## 2022-07-01 LAB
CHOLEST SERPL-MCNC: 223 MG/DL (ref 0–200)
GLUCOSE P FAST SERPL-MCNC: 77 MG/DL (ref 74–106)
HDLC SERPL-MCNC: 96 MG/DL (ref 40–60)
LDLC SERPL CALC-MCNC: 106 MG/DL
TRIGL SERPL-MCNC: 104 MG/DL (ref 0–150)

## 2022-07-01 PROCEDURE — 99395 PREV VISIT EST AGE 18-39: CPT | Performed by: PHYSICIAN ASSISTANT

## 2022-07-01 RX ORDER — CLOBETASOL PROPIONATE 0.46 MG/ML
SOLUTION TOPICAL
COMMUNITY
Start: 2022-06-06

## 2022-07-01 NOTE — PROGRESS NOTES
"Chief Complaint  Annual Exam and Anxiety    Subjective          Rianna Montano presents to [facility]  History of Present Illness  Rianna is a 27 year old female who presents for physical examination with anxiety management.  She had fasting lab work collected at outside facility in March 2022.  States her bowel movements have been daily without dark black tarry stools.  Diet has been improving.  She is starting to try to eat more fruits and vegetables.  She just returned from [place] last week.  She has been doing more walking for exercise.  Denied any fevers, chills, upper respiratory symptoms, chest pain, shortness of air, cough, wheezing, abdominal pain, urinary symptoms, or swelling of ankles.  Doing well with her sertraline medication.  Has helped with her anxiety issues.  Denied any suicidal or homicidal ideation.  Review of Systems   All other systems reviewed and are negative.    Objective   Vital Signs:   /80   Pulse 89   Temp 98.2 °F (36.8 °C)   Resp 15   Ht 149.2 cm (58.75\")   Wt 67.1 kg (148 lb)   SpO2 98%   BMI 30.15 kg/m²     Physical Exam  Vitals and nursing note reviewed.   Constitutional:       Appearance: Normal appearance. She is well-developed and well-groomed. She is obese.      Interventions: Face mask in place.   HENT:      Head: Normocephalic and atraumatic.      Jaw: There is normal jaw occlusion.      Right Ear: Hearing, tympanic membrane, ear canal and external ear normal.      Left Ear: Hearing, tympanic membrane, ear canal and external ear normal.      Nose: Nose normal.      Right Sinus: No maxillary sinus tenderness or frontal sinus tenderness.      Left Sinus: No maxillary sinus tenderness or frontal sinus tenderness.      Mouth/Throat:      Lips: Pink.      Mouth: Mucous membranes are moist.      Dentition: Normal dentition.      Tongue: No lesions.      Pharynx: Oropharynx is clear. Uvula midline.      Tonsils: No tonsillar exudate. "   Eyes:      General: Lids are normal.      Conjunctiva/sclera: Conjunctivae normal.      Pupils: Pupils are equal, round, and reactive to light.   Neck:      Thyroid: No thyroid mass, thyromegaly or thyroid tenderness.      Trachea: Trachea and phonation normal. No tracheal tenderness.   Cardiovascular:      Rate and Rhythm: Normal rate and regular rhythm.      Pulses: Normal pulses.      Heart sounds: Normal heart sounds, S1 normal and S2 normal. No murmur heard.  Pulmonary:      Effort: Pulmonary effort is normal.      Breath sounds: Normal breath sounds and air entry.   Abdominal:      General: Bowel sounds are normal.      Palpations: Abdomen is soft.      Tenderness: There is no abdominal tenderness. There is no right CVA tenderness, left CVA tenderness, guarding or rebound. Negative signs include Gallego's sign, Rovsing's sign, McBurney's sign, psoas sign and obturator sign.   Musculoskeletal:      Cervical back: Neck supple.      Right lower leg: No edema.      Left lower leg: No edema.   Lymphadenopathy:      Cervical: No cervical adenopathy.      Right cervical: No superficial, deep or posterior cervical adenopathy.     Left cervical: No superficial, deep or posterior cervical adenopathy.   Skin:     General: Skin is warm and dry.      Capillary Refill: Capillary refill takes less than 2 seconds.   Neurological:      Mental Status: She is alert and oriented to person, place, and time.      Deep Tendon Reflexes:      Reflex Scores:       Patellar reflexes are 2+ on the right side and 2+ on the left side.  Psychiatric:         Attention and Perception: Attention and perception normal.         Mood and Affect: Mood and affect normal.         Speech: Speech normal.         Behavior: Behavior is cooperative.         Thought Content: Thought content normal.         Cognition and Memory: Cognition and memory normal.         Judgment: Judgment normal.     I wore a facial mask, face shield, and gloves during this  patient encounter.  Patient also wearing a surgical mask.  Hand hygiene performed before and after seeing the patient.     Result Review :         Lipid Panel    Lipid Panel 3/23/22   Total Cholesterol 223 (A)   Triglycerides 104   HDL Cholesterol 96 (A)   (A) Abnormal value                      Assessment and Plan    Diagnoses and all orders for this visit:    1. Annual physical exam (Primary)    2. ROXANN (generalized anxiety disorder)    3. Hypercholesterolemia    1.  Annual physical examination with hypercholesteremia: I reviewed her above blood work that was collected at outside facility on March 23, 2022.  Cholesterol reading was elevated.  Stressed the importance of eating healthier by eating more fruits and vegetables and less fatty food.  Increase physical activity as well.  2.  Chronic and stable generalized anxiety disorder: Doing well with her sertraline medication.  No refills are needed at this time.  Plan to follow-up in 6 months.    Patient Counseling:  --Nutrition: Stressed importance of moderation in sodium/caffeine intake, saturated fat and cholesterol.  Discussed caloric balance, sufficient intake of fresh fruits, vegetables, fiber,   calcium, iron.  --Discussed the new recommendation against daily use of baby aspirin for primary prevention in low risk patients.  --Exercise: Stressed the importance of regular exercise by incorporating into daily routine.    --Substance Abuse: Discussed cessation/primary prevention of tobacco, alcohol, or other drug use; driving or other dangerous activities under the influence.    --Dental health: Discussed importance of regular tooth brushing, flossing, and dental visits.  -- Suggested having eyes and vision checked if needed or past due.  --Immunizations reviewed.          Follow Up   Return in about 6 months (around 1/1/2023), or ROXANN.  Patient was given instructions and counseling regarding her condition or for health maintenance advice. Please see specific  information pulled into the AVS if appropriate.     KATYA [facility]rington [facility]  [address] [address]  Dept: [phone]  Dept Fax: [phone]  Loc: [phone]  Loc Fax: [phone]

## 2022-07-05 ENCOUNTER — HOSPITAL ENCOUNTER (OUTPATIENT)
Dept: ULTRASOUND IMAGING | Facility: HOSPITAL | Age: 27
Discharge: HOME | End: 2022-07-05
Admitting: PHYSICIAN ASSISTANT

## 2022-07-05 DIAGNOSIS — E04.9 THYROID ENLARGEMENT: ICD-10-CM

## 2022-07-05 PROCEDURE — 76536 US EXAM OF HEAD AND NECK: CPT

## 2022-08-01 DIAGNOSIS — F41.9 ANXIETY: ICD-10-CM

## 2022-12-29 ENCOUNTER — TELEMEDICINE (OUTPATIENT)
Dept: FAMILY MEDICINE CLINIC | Facility: CLINIC | Age: 27
End: 2022-12-29

## 2022-12-29 DIAGNOSIS — F41.9 ANXIETY: ICD-10-CM

## 2022-12-29 DIAGNOSIS — F41.1 GAD (GENERALIZED ANXIETY DISORDER): Primary | ICD-10-CM

## 2022-12-29 DIAGNOSIS — K21.00 GASTROESOPHAGEAL REFLUX DISEASE WITH ESOPHAGITIS WITHOUT HEMORRHAGE: ICD-10-CM

## 2022-12-29 PROCEDURE — 99213 OFFICE O/P EST LOW 20 MIN: CPT | Performed by: PHYSICIAN ASSISTANT

## 2022-12-29 RX ORDER — DAPSONE 75 MG/G
GEL TOPICAL
COMMUNITY
Start: 2022-11-16

## 2022-12-29 RX ORDER — SPIRONOLACTONE 25 MG/1
25 TABLET ORAL DAILY
COMMUNITY
Start: 2022-07-05

## 2022-12-29 RX ORDER — CLINDAMYCIN PHOSPHATE AND BENZOYL PEROXIDE 10; 37.5 MG/G; MG/G
GEL TOPICAL
COMMUNITY
Start: 2022-11-16

## 2022-12-29 RX ORDER — RUXOLITINIB 15 MG/G
CREAM TOPICAL
COMMUNITY

## 2022-12-29 RX ORDER — RABEPRAZOLE SODIUM 20 MG/1
20 TABLET, DELAYED RELEASE ORAL DAILY
Qty: 30 TABLET | Refills: 2 | OUTPATIENT
Start: 2022-12-29 | End: 2023-02-28

## 2022-12-29 NOTE — PROGRESS NOTES
Chief Complaint  Anxiety (management) and Heartburn      You have chosen to receive care through a telehealth visit.  Do you consent to use a video/audio connection for your medical care today? Yes    Rianna is currently in her private vehicle in parking lot at the [employer] in [facility] in a secure location.    I am currently at my home residence and [facility] in a secure location.      Subjective          Rianna Montano presents to [facility]  History of Present Illness  Rianna is a 27-year-old female who presents using video visit for anxiety management.  Overall she is doing well with the sertraline medication.  He has helped with her anxiety issues.  Denied any suicidal or homicidal ideation.  Needs a refill.    She has noticed increased heartburn with certain types of food over the past several weeks.  States she does like Italian and tomato type products foods.  She has noticed this will increase her heartburn issues.  Has been trying to take Tums with some relief.  She may drink 1 soda a day.  Will occasionally eat spicy foods.  Denied any nausea, vomiting, diarrhea, or chest pain.  Her diet is usually healthy.  Sleep has been normal.  Tries to exercise occasionally with walking.  Review of Systems   HENT: Negative.    Eyes: Negative.    Respiratory: Negative.    Cardiovascular: Negative.    Gastrointestinal: Negative for abdominal pain, blood in stool, constipation, diarrhea, nausea and vomiting.        Heartburn   Endocrine: Negative.    Genitourinary: Negative.    Musculoskeletal: Negative.    Skin: Negative.    Hematological: Negative.    Psychiatric/Behavioral: Negative.  Negative for sleep disturbance and suicidal ideas.   All other systems reviewed and are negative.    Objective   Vital Signs: No vital signs obtained due to video visit.  There were no vitals taken for this visit.    Physical Exam  Constitutional:       Appearance:  Normal appearance. She is well-developed and well-groomed.   HENT:      Head: Normocephalic and atraumatic.      Right Ear: Hearing normal.      Left Ear: Hearing normal.   Neurological:      Mental Status: She is alert and oriented to person, place, and time.   Psychiatric:         Attention and Perception: Attention and perception normal.         Mood and Affect: Mood and affect normal.         Speech: Speech normal.         Behavior: Behavior normal. Behavior is cooperative.         Thought Content: Thought content normal.         Cognition and Memory: Cognition and memory normal.         Judgment: Judgment normal.     Physical exam was limited due to video visit.  Result Review :                 Assessment and Plan    Diagnoses and all orders for this visit:    1. ROXANN (generalized anxiety disorder) (Primary)    2. Gastroesophageal reflux disease with esophagitis without hemorrhage  -     RABEprazole (Aciphex) 20 MG EC tablet; Take 1 tablet by mouth Daily.  Dispense: 30 tablet; Refill: 2    3. Anxiety  -     sertraline (ZOLOFT) 50 MG tablet; Take 1 tablet by mouth Daily.  Dispense: 90 tablet; Refill: 2    1.  Chronic and stable generalized anxiety disorder: Doing well with the sertraline medication.  I have sent a refill to pharmacy.  Will return to office in 6 months for annual physical exam.  2.  New heartburn: I have sent a prescription for AcipHex to pharmacy.  Will use as directed.  Stressed the importance of limiting caffeine intake, carbonated drink, alcohol and spicy foods/tomato products.  She voiced understanding.    I spent 20 minutes caring for Rianna on this date of service. This time includes time spent by me in the following activities:preparing for the visit, obtaining and/or reviewing a separately obtained history, performing a medically appropriate examination and/or evaluation , counseling and educating the patient/family/caregiver, ordering medications, tests, or procedures and documenting  information in the medical record  Follow Up   Return in about 6 months (around 6/29/2023), or anxiety, for Annual.  Patient was given instructions and counseling regarding her condition or for health maintenance advice. Please see specific information pulled into the AVS if appropriate.     KATYA Harrington [facility]  [address] [place] [phone]  Dept: [phone]  Dept Fax: [phone]  Loc: [phone]  Loc Fax: [phone]

## 2023-03-27 DIAGNOSIS — K21.00 GASTROESOPHAGEAL REFLUX DISEASE WITH ESOPHAGITIS WITHOUT HEMORRHAGE: ICD-10-CM

## 2023-03-27 RX ORDER — RABEPRAZOLE SODIUM 20 MG/1
20 TABLET, DELAYED RELEASE ORAL DAILY
Qty: 30 TABLET | Refills: 2 | Status: SHIPPED | OUTPATIENT
Start: 2023-03-27

## 2023-07-02 PROBLEM — Z23 NEED FOR INFLUENZA VACCINATION: Status: RESOLVED | Noted: 2019-01-05 | Resolved: 2023-07-02

## 2023-07-02 PROBLEM — Z23 NEED FOR TETANUS, DIPHTHERIA, AND ACELLULAR PERTUSSIS (TDAP) VACCINE: Status: RESOLVED | Noted: 2020-01-16 | Resolved: 2023-07-02

## 2023-07-03 PROBLEM — E66.811 OBESITY (BMI 30.0-34.9): Status: ACTIVE | Noted: 2023-07-03

## 2023-07-03 PROBLEM — E66.9 OBESITY (BMI 30.0-34.9): Status: ACTIVE | Noted: 2023-07-03

## 2023-08-04 DIAGNOSIS — D64.9 HEMOGLOBIN LOW: Primary | ICD-10-CM

## 2023-08-08 LAB
FERRITIN SERPL-MCNC: 11 NG/ML (ref 15–150)
IRON SATN MFR SERPL: 6 % (ref 15–55)
IRON SERPL-MCNC: 27 UG/DL (ref 27–159)
TIBC SERPL-MCNC: 457 UG/DL (ref 250–450)
UIBC SERPL-MCNC: 430 UG/DL (ref 131–425)

## 2023-08-09 DIAGNOSIS — D50.9 IRON DEFICIENCY ANEMIA, UNSPECIFIED IRON DEFICIENCY ANEMIA TYPE: Primary | ICD-10-CM

## 2023-11-17 DIAGNOSIS — F41.9 ANXIETY: ICD-10-CM

## 2023-12-18 ENCOUNTER — OFFICE VISIT (OUTPATIENT)
Dept: FAMILY MEDICINE CLINIC | Facility: CLINIC | Age: 28
End: 2023-12-18
Payer: COMMERCIAL

## 2023-12-18 VITALS
HEIGHT: 59 IN | DIASTOLIC BLOOD PRESSURE: 74 MMHG | SYSTOLIC BLOOD PRESSURE: 120 MMHG | OXYGEN SATURATION: 99 % | RESPIRATION RATE: 14 BRPM | BODY MASS INDEX: 32.46 KG/M2 | HEART RATE: 78 BPM | WEIGHT: 161 LBS | TEMPERATURE: 98 F

## 2023-12-18 DIAGNOSIS — D50.9 IRON DEFICIENCY ANEMIA, UNSPECIFIED IRON DEFICIENCY ANEMIA TYPE: ICD-10-CM

## 2023-12-18 DIAGNOSIS — F41.1 GAD (GENERALIZED ANXIETY DISORDER): Primary | ICD-10-CM

## 2023-12-18 PROCEDURE — 99213 OFFICE O/P EST LOW 20 MIN: CPT | Performed by: PHYSICIAN ASSISTANT

## 2023-12-18 NOTE — PROGRESS NOTES
"Chief Complaint  Anxiety    Subjective          Rianna Montano presents to [facility]  History of Present Illness  Rianna is a 28 year old female who presents for generalized anxiety management.  She has gained 6 pounds since last office visit in July 2023.  Overall she is doing well at office visit today.  Sertraline has helped with her anxiety issues.  Denied any suicidal or homicidal ideation.  She has been taking her medication as directed.  States she decided to come off the Depo shot.  She has been on it.  Since coming off the medication.  She is getting an appointment with her gynecologist to discuss other birth control options.  States the Depo shot did not help much with her heavy periods.  Denied any  PICA.  Appetite and sleep have been normal.  States she has not been as active.  Bowel movements are daily without dark black tarry stools.     Objective   Vital Signs:   /74 (BP Location: Left arm, Patient Position: Sitting, Cuff Size: Adult)   Pulse 78   Temp 98 °F (36.7 °C)   Resp 14   Ht 149.2 cm (58.74\")   Wt 73 kg (161 lb)   SpO2 99%   BMI 32.81 kg/m²     Physical Exam  Vitals and nursing note reviewed.   Constitutional:       Appearance: Normal appearance. She is well-developed and well-groomed. She is obese.   HENT:      Head: Normocephalic and atraumatic.   Neck:      Trachea: Trachea and phonation normal. No tracheal tenderness.   Cardiovascular:      Rate and Rhythm: Normal rate and regular rhythm.      Pulses: Normal pulses.      Heart sounds: Normal heart sounds, S1 normal and S2 normal. No murmur heard.  Pulmonary:      Effort: Pulmonary effort is normal.      Breath sounds: Normal breath sounds and air entry.   Abdominal:      General: Bowel sounds are normal.      Palpations: Abdomen is soft. There is no hepatomegaly.      Tenderness: There is no abdominal tenderness. There is no right CVA tenderness, left CVA tenderness, guarding or rebound. " Negative signs include Gallego's sign, Rovsing's sign, McBurney's sign, psoas sign and obturator sign.   Musculoskeletal:      Cervical back: Neck supple.      Right lower leg: No edema.      Left lower leg: No edema.   Skin:     General: Skin is warm and dry.      Capillary Refill: Capillary refill takes less than 2 seconds.   Neurological:      Mental Status: She is alert and oriented to person, place, and time.   Psychiatric:         Attention and Perception: Attention and perception normal.         Mood and Affect: Mood and affect normal.         Speech: Speech normal.         Behavior: Behavior normal. Behavior is cooperative.         Thought Content: Thought content normal.         Cognition and Memory: Cognition and memory normal.         Judgment: Judgment normal.        Result Review :                 Assessment and Plan    Diagnoses and all orders for this visit:    1. ROXANN (generalized anxiety disorder) (Primary)    2. Iron deficiency anemia, unspecified iron deficiency anemia type    1.  Chronic and stable generalized anxiety disorder: Doing well with the sertraline medication.  No refills are needed.  Return to office in July for annual physical exam.  2.  Chronic and stable iron deficiency anemia: I will check CBC, iron profile and ferritin levels today.  She will be notified of test results when completed.    I spent 16 minutes caring for Rianna on this date of service. This time includes time spent by me in the following activities:preparing for the visit, obtaining and/or reviewing a separately obtained history, performing a medically appropriate examination and/or evaluation , counseling and educating the patient/family/caregiver, and documenting information in the medical record  Follow Up   Return in about 7 months (around 7/18/2024), or same day labs-labs today, for Annual.  Patient was given instructions and counseling regarding her condition or for health maintenance advice. Please see specific  information pulled into the AVS if appropriate.     [facility]  [address] [address]  Dept: [phone]  Dept Fax: [phone]  Loc: [phone]  Loc Fax: [phone]

## 2024-01-08 DIAGNOSIS — D50.9 IRON DEFICIENCY ANEMIA, UNSPECIFIED IRON DEFICIENCY ANEMIA TYPE: ICD-10-CM

## 2024-02-14 DIAGNOSIS — F41.9 ANXIETY: ICD-10-CM

## 2024-10-21 ENCOUNTER — OFFICE VISIT (OUTPATIENT)
Dept: FAMILY MEDICINE CLINIC | Facility: CLINIC | Age: 29
End: 2024-10-21
Payer: COMMERCIAL

## 2024-10-21 VITALS
SYSTOLIC BLOOD PRESSURE: 102 MMHG | DIASTOLIC BLOOD PRESSURE: 62 MMHG | WEIGHT: 160.5 LBS | HEIGHT: 59 IN | HEART RATE: 92 BPM | TEMPERATURE: 97.5 F | BODY MASS INDEX: 32.36 KG/M2 | OXYGEN SATURATION: 98 %

## 2024-10-21 DIAGNOSIS — Z86.2 HISTORY OF IRON DEFICIENCY ANEMIA: ICD-10-CM

## 2024-10-21 DIAGNOSIS — E66.811 OBESITY (BMI 30.0-34.9): ICD-10-CM

## 2024-10-21 DIAGNOSIS — F41.9 ANXIETY: ICD-10-CM

## 2024-10-21 DIAGNOSIS — Z00.00 ENCOUNTER FOR ANNUAL PHYSICAL EXAM: Primary | ICD-10-CM

## 2024-10-21 DIAGNOSIS — Z11.59 ENCOUNTER FOR HEPATITIS C SCREENING TEST FOR LOW RISK PATIENT: ICD-10-CM

## 2024-10-21 DIAGNOSIS — E78.00 HYPERCHOLESTEROLEMIA: ICD-10-CM

## 2024-10-21 DIAGNOSIS — Z23 NEED FOR COVID-19 VACCINE: ICD-10-CM

## 2024-10-21 DIAGNOSIS — L20.9 ATOPIC DERMATITIS, UNSPECIFIED TYPE: ICD-10-CM

## 2024-10-21 DIAGNOSIS — E28.2 PCOS (POLYCYSTIC OVARIAN SYNDROME): ICD-10-CM

## 2024-10-21 DIAGNOSIS — F50.89 OTHER DISORDER OF EATING: ICD-10-CM

## 2024-10-21 DIAGNOSIS — L68.0 HIRSUTISM: ICD-10-CM

## 2024-10-21 DIAGNOSIS — Z76.89 ENCOUNTER TO ESTABLISH CARE: ICD-10-CM

## 2024-10-21 PROCEDURE — 91320 SARSCV2 VAC 30MCG TRS-SUC IM: CPT

## 2024-10-21 PROCEDURE — 99395 PREV VISIT EST AGE 18-39: CPT

## 2024-10-21 PROCEDURE — 90480 ADMN SARSCOV2 VAC 1/ONLY CMP: CPT

## 2024-10-21 RX ORDER — BIOTIN 10 MG
TABLET ORAL
COMMUNITY

## 2024-10-21 RX ORDER — RUXOLITINIB 15 MG/G
1 CREAM TOPICAL DAILY
Qty: 60 G | Refills: 3 | Status: SHIPPED | OUTPATIENT
Start: 2024-10-21

## 2024-10-21 NOTE — PROGRESS NOTES
"Chief Complaint   Patient presents with    Establish Care    Polycystic Ovary Syndrome       Patient Care Team:  Marita Cervantes APRN as PCP - General (Family Medicine)    Subjective   Rianna Montano is a 29 y.o. female was a patient of Mirela Eisenberg PA-C who is no longer with our practice.  I will be taking over this patient's primary care.  This is the first time patient is seeing me. She is here to establish care and is here for a yearly physical exam. The patient reports problems - recent diagnosis of PCOS and lifelong restrictive/avoidant eating pattern . Chronic care management includes hyperlipidemia, anxiety, PCOS, and history of iron deficiency anemia. Last physical 7/23. Weight stable since last visit with BMI in obese range at 32.70.    Hyperlipidemia - 2 years ago had , most recent level in 7/23 176. Not currently taking medication for this.     Anxiety - currently taking sertraline 50mg daily with good control of symptoms. She does report a history of very restrictive/avoidant eating patterns that she is concerned may be more serious than being a \"picky eater\". Has not been to therapy in the past.     PCOS - diagnosed by OB/Gyn a few months ago. Currently has an IUD in place for irregular menstrual periods after not having very heavy bleeding followed by not having a cycle for over 3 months. Also on spironolactone for facial hair and acne but she doesn't think this is helping much.     Prediabetes - last A1C in April was 5.7, not currently taking medication for this.     Iron deficiency anemia - previously had heavy menses and was taking ferrous sulfate daily. Has not taken since bleeding was improved with IUD. Last ferritin and TIBC normal 12/23.     Atopic dermatitis - sees dermatology and uses Opzelara cream for dry crusting skin on her head. Needs refills.    Do you take any herbs or supplements that were not prescribed by a doctor? yes daily multivitamin. If so, these will be added " "to active medication list.    Health Habits:  Dental Exam: Up to date  Eye Exam: Up to date  Diet: very restrictive due to textures - only fruits and veggies are apple and watermelon, carrots, lettuce occasionally. Protein is mostly chicken, occasionally tuna, some beef. Whole wheat bread, cheese.   Exercise: daily through occupation  Current exercise activities include: yoga, walking  Pap: followed by OB/Gyn [facility]; 2022 negative, remote history of ASCUS pap  Mammogram: never  Dexa: never  Colonoscopy: never    The following portions of the patient's history were reviewed and updated as appropriate: allergies, current medications, past family history, past medical history, past social history, past surgical history, and problem list.    Social and Family and Surgical History reviewed and updated today, see Rooming tab.    Health History, Preventive Measures and Vaccination flow sheets reviewed and updated today.    Patient's current medical chart in Epic; including previous office notes, imaging, labs, specialist's evaluation either in notes or in Media tab reviewed today.    Other pertinent medical information also reviewed thru Care Everywhere function is also reviewed today.    Review of Systems  Review of Systems  Vitals:    10/21/24 1401   BP: 102/62   Pulse: 92   Temp: 97.5 °F (36.4 °C)   TempSrc: Infrared   SpO2: 98%   Weight: 72.8 kg (160 lb 8 oz)   Height: 149.2 cm (58.74\")     Wt Readings from Last 3 Encounters:   10/21/24 72.8 kg (160 lb 8 oz)   12/18/23 73 kg (161 lb)   07/03/23 70.3 kg (155 lb)       Physical Exam  Vitals reviewed.   Constitutional:       General: She is not in acute distress.     Appearance: Normal appearance. She is obese.   HENT:      Head: Normocephalic and atraumatic.      Right Ear: Tympanic membrane normal.      Left Ear: Tympanic membrane normal.      Mouth/Throat:      Mouth: Mucous membranes are moist.      Pharynx: Oropharynx is clear. No oropharyngeal exudate.   Eyes:      " Conjunctiva/sclera: Conjunctivae normal.      Pupils: Pupils are equal, round, and reactive to light.   Neck:      Thyroid: No thyromegaly.      Vascular: No carotid bruit.   Cardiovascular:      Rate and Rhythm: Normal rate and regular rhythm.      Pulses: Normal pulses.      Heart sounds: Normal heart sounds. No murmur heard.     No friction rub.   Pulmonary:      Effort: Pulmonary effort is normal. No respiratory distress.      Breath sounds: Normal breath sounds. No wheezing.   Abdominal:      General: Abdomen is protuberant. Bowel sounds are normal.      Palpations: Abdomen is soft. There is no mass.      Tenderness: There is no abdominal tenderness.      Hernia: No hernia is present.   Musculoskeletal:      Cervical back: Neck supple.      Right lower leg: No edema.      Left lower leg: No edema.   Lymphadenopathy:      Cervical: No cervical adenopathy.   Skin:     General: Skin is warm and dry.      Capillary Refill: Capillary refill takes less than 2 seconds.      Comments: Multiple closed comedones, upper lip and chin, no excess facial hair noted, patient reports she shaves her face   Neurological:      General: No focal deficit present.      Mental Status: She is alert and oriented to person, place, and time.   Psychiatric:         Mood and Affect: Mood normal.         Behavior: Behavior normal.         BMI is >= 30 and <35. (Class 1 Obesity). The following options were offered after discussion;: weight loss educational material (shared in after visit summary), exercise counseling/recommendations, and nutrition counseling/recommendations       Results for orders placed or performed in visit on 11/09/23   Ferritin    Collection Time: 12/18/23  2:02 PM    Specimen: Blood   Result Value Ref Range    Ferritin 21 15 - 150 ng/mL   Iron Profile    Collection Time: 12/18/23  2:02 PM    Specimen: Blood   Result Value Ref Range    TIBC 381 250 - 450 ug/dL    UIBC 345 131 - 425 ug/dL    Iron 36 27 - 159 ug/dL    Iron  Saturation 9 (L) 15 - 55 %   CBC & Differential    Collection Time: 12/18/23  2:02 PM    Specimen: Blood   Result Value Ref Range    WBC 8.8 3.4 - 10.8 x10E3/uL    RBC 5.64 (H) 3.77 - 5.28 x10E6/uL    Hemoglobin 14.3 11.1 - 15.9 g/dL    Hematocrit 43.4 34.0 - 46.6 %    MCV 77 (L) 79 - 97 fL    MCH 25.4 (L) 26.6 - 33.0 pg    MCHC 32.9 31.5 - 35.7 g/dL    RDW 15.6 (H) 11.7 - 15.4 %    Platelets 294 150 - 450 x10E3/uL    Neutrophil Rel % 69 Not Estab. %    Lymphocyte Rel % 24 Not Estab. %    Monocyte Rel % 4 Not Estab. %    Eosinophil Rel % 2 Not Estab. %    Basophil Rel % 1 Not Estab. %    Neutrophils Absolute 6.1 1.4 - 7.0 x10E3/uL    Lymphocytes Absolute 2.1 0.7 - 3.1 x10E3/uL    Monocytes Absolute 0.4 0.1 - 0.9 x10E3/uL    Eosinophils Absolute 0.2 0.0 - 0.4 x10E3/uL    Basophils Absolute 0.1 0.0 - 0.2 x10E3/uL    Immature Granulocyte Rel % 0 Not Estab. %    Immature Grans Absolute 0.0 0.0 - 0.1 x10E3/uL     Assessment & Plan   Healthy female exam.  Diagnoses and all orders for this visit:    1. Encounter for annual physical exam (Primary)  2. Encounter to establish care  3. Encounter for hepatitis C screening test for low risk patient  Reviewed all pertinent medical, family, surgical, and social history today. Will check fasting labs in the next few days and await results for further recommendation.  -     Lipid Panel With / Chol / HDL Ratio; Future  -     CBC (No Diff); Future  -     Comprehensive Metabolic Panel; Future  -     TSH Rfx On Abnormal To Free T4; Future  -     Hemoglobin A1c; Future  -     HCV Antibody Rfx To Qnt PCR; Future    4. Hypercholesterolemia  Chronic, controlled. Will check fasting labs and await results for further recommendation.  -     Lipid Panel With / Chol / HDL Ratio; Future  -     CBC (No Diff); Future  -     Comprehensive Metabolic Panel; Future  -     TSH Rfx On Abnormal To Free T4; Future  -     Hemoglobin A1c; Future    5. History of iron deficiency anemia  Chronic, controlled.  Will check labs and await results for further recommendation.  -     CBC (No Diff); Future  -     Iron Profile; Future  -     Ferritin; Future    6. Hirsutism  7. Obesity (BMI 30.0-34.9)  8. PCOS (polycystic ovarian syndrome)  Chronic, controlled. Continue current spironolactone. Keep scheduled follow-ups with OB/Gyn. Will check fasting labs and await results for further recommendation.  -     Lipid Panel With / Chol / HDL Ratio; Future  -     CBC (No Diff); Future  -     Comprehensive Metabolic Panel; Future  -     TSH Rfx On Abnormal To Free T4; Future  -     Hemoglobin A1c; Future    9. Anxiety  Chronic, controlled. Refills sent for Zoloft.  -     sertraline (ZOLOFT) 50 MG tablet; Take 1 tablet by mouth Daily.  Dispense: 90 tablet; Refill: 2    10. Other disorder of eating  Chronic, uncontrolled. 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 List of counseling resources given to patient.  -     Ambulatory Referral to Psychology    11. Atopic dermatitis, unspecified type  Chronic, controlled. Refill sent for Opzelura.   -     Ruxolitinib Phosphate (Opzelura) 1.5 % cream; Apply 1 Application topically Daily. Indications: Atopic Dermatitis  Dispense: 60 g; Refill: 3    12. Need for COVID-19 vaccine  -     COVID-19 (Pfizer) 12yrs+ (COMIRNATY)        1. Reviewed all pertinent medical, family, surgical, and social history today. Will get labs in the next few days. All other screenings up to date.  2. Patient Counseling:  --Nutrition: Stressed importance of moderation in sodium/caffeine intake, saturated fat and cholesterol.  Discussed caloric balance, sufficient intake of fresh fruits, vegetables, fiber,    calcium, iron.  --Exercise: Stressed the importance of regular exercise.   --Substance Abuse: Discussed cessation/primary prevention of tobacco, alcohol, or other drug use; driving or other dangerous activities under the influence.    --Dental health: Discussed importance of  regular tooth brushing, flossing, and dental visits.  --Suggested having eyes and vision checked if needed or past due.  --Immunizations reviewed.  3. Discussed the patient's BMI with her.  The BMI is above average; BMI management plan is completed  4. Follow up in 6 months for anxiety follow-up.    Patient was given instructions and counseling regarding condition or for health maintenance advice.  Please see specific information pulled into the AVS if appropriate.      Medications Discontinued During This Encounter   Medication Reason    clobetasol (TEMOVATE) 0.05 % external solution *Therapy completed    Clindamycin Phos-Benzoyl Perox (Onexton) 1.2-3.75 % gel *Therapy completed    Dapsone 7.5 % gel *Therapy completed    RABEprazole (ACIPHEX) 20 MG EC tablet *Therapy completed    Ferrous Sulfate (Iron High-Potency) 142 (45 Fe) MG tablet controlled-release *Therapy completed    sertraline (ZOLOFT) 50 MG tablet Reorder    Ruxolitinib Phosphate (Opzelura) 1.5 % cream Reorder          WINSOME Fox  [facility]  [address] [place] [address]

## 2024-10-23 DIAGNOSIS — L20.9 ATOPIC DERMATITIS, UNSPECIFIED TYPE: Primary | ICD-10-CM

## 2024-10-23 RX ORDER — TACROLIMUS 1 MG/G
1 OINTMENT TOPICAL 2 TIMES DAILY
Qty: 100 G | Refills: 3 | Status: SHIPPED | OUTPATIENT
Start: 2024-10-23

## 2024-10-24 DIAGNOSIS — E66.811 OBESITY (BMI 30.0-34.9): ICD-10-CM

## 2024-10-24 DIAGNOSIS — Z00.00 ENCOUNTER FOR ANNUAL PHYSICAL EXAM: ICD-10-CM

## 2024-10-24 DIAGNOSIS — E28.2 PCOS (POLYCYSTIC OVARIAN SYNDROME): ICD-10-CM

## 2024-10-24 DIAGNOSIS — E78.00 HYPERCHOLESTEROLEMIA: ICD-10-CM

## 2024-11-13 ENCOUNTER — OFFICE VISIT (OUTPATIENT)
Age: 29
End: 2024-11-13
Payer: COMMERCIAL

## 2024-11-13 VITALS
SYSTOLIC BLOOD PRESSURE: 122 MMHG | HEIGHT: 59 IN | BODY MASS INDEX: 32.25 KG/M2 | WEIGHT: 160 LBS | HEART RATE: 97 BPM | OXYGEN SATURATION: 95 % | DIASTOLIC BLOOD PRESSURE: 59 MMHG

## 2024-11-13 DIAGNOSIS — F42.2 MIXED OBSESSIONAL THOUGHTS AND ACTS: Primary | ICD-10-CM

## 2024-11-13 DIAGNOSIS — R63.39 FOOD AVERSION: ICD-10-CM

## 2024-11-13 RX ORDER — SERTRALINE HYDROCHLORIDE 100 MG/1
100 TABLET, FILM COATED ORAL DAILY
Qty: 30 TABLET | Refills: 1 | Status: SHIPPED | OUTPATIENT
Start: 2024-11-13 | End: 2025-01-12

## 2024-11-13 NOTE — PROGRESS NOTES
"Chief Complaint: \"anxiety with food\"     Subjective      Rianna Montano presents to [facility] for a mental health evaluation.     HPI :     Pt is a 29 y.o. yo female who is being seen here at the clinic for a mental health evaluation. Pt referred by her PCP for an eating disorder and anxiety. Pt reports a history of social anxiety and some mild depression that started during childhood. States she never sought treatment for her anxiety and depression until she was 21 when she was started on Zoloft 50mg daily. Pt continues to take Zoloft 50mg daily which has greatly improved her social anxiety. States she would worry how others would perceive her.  States that most social situations would cause her great anxiety.  States that she does experience social anxiety still but states it is much more manageable now.  Patient states that since childhood she has had obsessive thoughts that she is constantly forgetting something, obsessing over knowing certain details, and fear that she has lost something.  Reports that she does complete compulsions to relieve her of her anxiety such as, checking that she has not forgotten something several times, excessive list making, and ritualized eating.  Reports that these obsessive thoughts and compulsions do take up between 1 to 3 hours every day and to cause her distress/impacts her ability to function.  States that she has been doing these things since before she can remember.    Patient reports that she is a very picky eater and has been since childhood.  States that she has \"safe foods\" such as chicken nuggets, mac & cheese, cheeseburgers, bread, cheese, peanut butter, carrots, Duke salad, fries, and pizza.  States that she has never been underweight due to her picky eating and has never had any nutritional deficiencies.  Reports that she has never required any type of nutritional supplements or tube feedings.  Patient feels that her picky " eating has never impacted her ability to function.  Patient states that her picky eating is now starting to catch up with her because she recently had some lab work done with her PCP that showed that she is prediabetic and has elevated cholesterol.  Patient is unsure exactly how to get her cholesterol and blood sugar under control if she only eats things like nuggets and fries.  States it is not only a taste issue but she has problems with texture of food as well.    Reports to sleep 6 to 8 hours each night.  States her appetite is good.  Denies SI/HI/AVH.    Psychiatric Review of Systems:    Depression: depressed mood, decreased appetite, and fatigue or loss of energy   Kelsy: Denies symptoms of kelsy.   Anxiety: Reports to feel anxious at times but states it is usually manageable.   Psychosis: Denies symptoms of psychosis.   Panic Attacks: Denies any panic attacks.   Agoraphobia: Denies symptoms of agoraphobia.  OCD: Intrusive and unwanted thoughts, Repetitive behaviors the individual feels driven to perform in response to an obsession , the behaviors or mental acts are aimed at preventing or reducing anxiety or distress preventing some dreaded event or situation, Obsessions are time-consuming, and Compulsions are time-consuming   Eating Disorders: Reports that she has been a picky eater majority of her life but has never had issues maintaining a healthy weight.   PTSD: Denies symptoms of PTSD.    Specific Phobias: Denies any phobias.  Borderline Personality DO: Denies symptoms of borderline personality disorder.  Antisocial Personality DO: Symptoms of antisocial personality disorder.    Past Psychiatric History:   Diagnoses: Anxiety.   Hospitalizations: Denies.   Counseling: Denies.   Suicide attempts: Denies.   Self Harm: Denies.     Previous Psych Meds: Just zoloft.     Substance Use/Abuse:   Caffeine: Occasional soda.   Alcohol: Denies.   Tobacco: Denies.   Illicit substances: Denies.   IVDU: Denies.   History  of formal substance abuse treatment: Denies.     Social History:    Family structure: Lives with her mother and father.     Education: Bachelors in education.    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    Supportive relationships: Mother, father, brother, and aunts.    Muslim/Shyla: Not Voodoo.     Abuse History:      Legal History:    Incarceration: Denies.     History of violence: Denies.      History: Denies.     Past Medical/Developmental History:    Chronic Illnesses: Listed below.    Head trauma: Denies.     Past Medical History:   Diagnosis Date    Anemia     Have had it off and on    Anxiety     Bronchitis     Clotting disorder     PCOS    Diabetes mellitus     Pre-Diabetic during last blood test    GERD (gastroesophageal reflux disease)     Acid reflux      Past Surgical History:   Procedure Laterality Date    BILATERAL BREAST REDUCTION      BREAST SURGERY  12/2015    Reduction    EYE SURGERY  April 2024    Lasik    HEAD/NECK LESION/CYST EXCISION N/A 06/20/2017    Procedure: HEAD NECK LESION/CYST EXCISION  Wide excision of scalp mass;  Surgeon: Domi Hardin MD;  Location: [facility];  Service:     HYMENECTOMY      TONSILLECTOMY AND ADENOIDECTOMY       Family Psychiatric History:    Psychiatric history: Denies.     Current Medications:   Current Outpatient Medications   Medication Sig Dispense Refill    Multiple Vitamins-Minerals (Multivitamin Adult) chewable tablet Chew.      Ruxolitinib Phosphate (Opzelura) 1.5 % cream Apply 1 Application topically Daily. Indications: Atopic Dermatitis 60 g 3    spironolactone (ALDACTONE) 50 MG tablet Take 1 tablet by mouth Daily.      sertraline (Zoloft) 100 MG tablet Take 1 tablet by mouth Daily for 60 days. 30 tablet 1    tacrolimus (Protopic) 0.1 % ointment Apply 1 Application topically to the appropriate area as directed 2 (Two) Times a Day. Indications: Atopic Dermatitis (Patient not taking: Reported on 11/13/2024) 100 g 3     No current  "facility-administered medications for this visit.     Review of Systems   Constitutional:  Negative for appetite change.   HENT:  Negative for sore throat.    Eyes:  Negative for visual disturbance.   Respiratory:  Negative for chest tightness and shortness of breath.    Cardiovascular:  Negative for chest pain and palpitations.   Gastrointestinal:  Negative for abdominal pain, constipation, diarrhea and nausea.   Genitourinary:  Negative for difficulty urinating.   Neurological:  Negative for dizziness, tremors and memory problem.   Psychiatric/Behavioral:  Negative for hallucinations, sleep disturbance and suicidal ideas.       Mental Status Exam:   MENTAL STATUS EXAM   General Appearance:  Cleanly groomed and dressed  Eye Contact:  Good eye contact  Attitude:  Cooperative  Motor Activity:  Normal gait, posture  Muscle Strength:  Normal  Speech:  Normal rate, tone, volume  Language:  Spontaneous  Mood and affect:  Normal, pleasant  Hopelessness:  Denies  Loneliness: Denies  Thought Process:  Logical  Associations/ Thought Content:  No delusions  Hallucinations:  None  Suicidal Ideations:  Not present  Homicidal Ideation:  Not present  Sensorium:  Alert and clear  Orientation:  Person, place, situation and time  Attention Span/ Concentration:  Good  Fund of Knowledge:  Appropriate for age and educational level  Intellectual Functioning:  Average range  Insight:  Good  Judgement:  Good  Reliability:  Good  Impulse Control:  Good     Objective   Vital Signs:   /59   Pulse 97   Ht 149.2 cm (58.75\")   Wt 72.6 kg (160 lb)   SpO2 95%   BMI 32.59 kg/m²       Result Review :       TSH          10/25/2024    10:35   TSH   TSH 3.150                Assessment and Plan      PHQ-9 Score:   PHQ-9 Total Score: 3    PHQ-9 Depression Screening  Little interest or pleasure in doing things? Not at all   Feeling down, depressed, or hopeless? Several days   PHQ-2 Total Score 1   Trouble falling or staying asleep, or sleeping " too much? Not at all   Feeling tired or having little energy? Several days   Poor appetite or overeating? Several days   Feeling bad about yourself - or that you are a failure or have let yourself or your family down? Not at all   Trouble concentrating on things, such as reading the newspaper or watching television? Not at all   Moving or speaking so slowly that other people could have noticed? Or the opposite - being so fidgety or restless that you have been moving around a lot more than usual? Not at all   Thoughts that you would be better off dead, or of hurting yourself in some way? Not at all   PHQ-9 Total Score 3   If you checked off any problems, how difficult have these problems made it for you to do your work, take care of things at home, or get along with other people? Not difficult at all     Depression Screening:  Patient screened positive for depression based on a PHQ-9 score of 3 on 11/13/2024. Follow-up recommendations include: Prescribed antidepressant medication treatment.    ROXANN-7      Over the last two weeks, how often have you been bothered by the following problems?  Feeling nervous, anxious or on edge: Several days  Not being able to stop or control worrying: Not at all  Worrying too much about different things: Several days  Trouble Relaxing: Not at all  Being so restless that it is hard to sit still: Not at all  Becoming easily annoyed or irritable: More than half the days  Feeling afraid as if something awful might happen: Not at all  ROXANN 7 Total Score: 4  If you checked any problems, how difficult have these problems made it for you to do your work, take care of things at home, or get along with other people: Somewhat difficult                  11/13/24 1400   Miscellaneouse Obsessions   The need to know or remember Current   Fear of losing things Current   Checking compulsions   Checking that did not make mistake Current   Repeating Rituals   Need to repeat activities (e.g. In / out of  doorway, up / down from chair Current   Miscellaneous Compulsions   Rituals other than checking / counting Current   Excessive List Making * Current   Ritualized Eating Behaviors * Current   Other (describe)   (skin picking)        11/13/24 1400   Obsessions Questions (Items 1 - 5)   1. Time Occupied By Obsessive Thoughts 2   2. Interference Due to Obsessive Thoughts   How much do these thoughts get in the way of school or doing things with friends?  Is there anything that you don't do because of them?   2   3. Distress Associated with Obsessive Thoughts 2   4. Resistance Against Obsessions  How hard do you try to stop the thoughts or ignore them ?   2   5. Degree of Control Over Obsessive Thoughts 2   Obsession Subtotal  Scores 10   Compulsion Questions (Items 6 - 10)   6. Time Spent Performing Compulsive Behaviors 2   7. Interference Due To Compulsive Behaviors    How much do these  habits get in the way of school or doing things with friends?  Is there anything you don't do because of them? 2   8. Distress Associated with Compulsive Behavior  How would you feel if prevented from carrying out your habits?  How upset would you become? 2   9. Resistance Against Compulsions  How much do you try to fight the habits?   2   10. How strong is the feeling that you have to carry out the habits?  When you try to fight them, what happens 3   Compulsion Subtotal Score 11   Total CY-BOCS score: 21   Range Of Severity For Patient Who Have Both Obsession and Compulsions Moderate       Tobacco Cessation:  N/A.    Impression/Treatment Plan:  Patient is a 29-year-old female with a history of anxiety and mild depression since childhood. Pt reports a history of social anxiety and some mild depression that started during childhood. States she never sought treatment for her anxiety and depression until she was 21 when she was started on Zoloft 50mg daily. Pt continues to take Zoloft 50mg daily which has greatly improved her social  anxiety.  After interviewing patient it seems that patient does have obsessive thoughts and compulsions.  Patient does meet criteria for OCD.  Patient does not meet criteria for ARFID as she has never had issues with significant weight loss or any nutrient deficiencies that required a feeding tube or oral nutritional supplements.  Patient does seem to be a very picky eater and has issues with texture.  I do recommend patient go to therapy and to see a dietitian.  Provided patient with the contact information for [facility] where she can attend therapy and see a dietitian specialized in that field.  Will increase patient's Zoloft to 100 mg daily to further improve patient's OCD symptoms.  Will see patient in 1 month.    Short-term goals: Continue to develop rapport with patient.    Long-term goals: Symptom reduction with medication and therapy.    Weaknesses: Picky eating.    Strengths: Great support from family.    Diagnoses and all orders for this visit:    1. Mixed obsessional thoughts and acts (Primary)  -     Ambulatory Referral to Psychology    2. Food aversion  -     Ambulatory Referral to Psychology    Other orders  -     sertraline (Zoloft) 100 MG tablet; Take 1 tablet by mouth Daily for 60 days.  Dispense: 30 tablet; Refill: 1      MEDS ORDERED DURING VISIT:  New Medications Ordered This Visit   Medications    sertraline (Zoloft) 100 MG tablet     Sig: Take 1 tablet by mouth Daily for 60 days.     Dispense:  30 tablet     Refill:  1       Follow Up   Return in about 4 weeks (around 12/11/2024) for Recheck.  Patient was given instructions and counseling regarding her condition or for health maintenance advice. Please see specific information pulled into the AVS if appropriate.     PATIENT EDUCATION:  - Discussed medication options and treatment plan of prescribed medication as well as the risks, benefits, and side effects   - Encouraged pt to continue supportive psychotherapy  efforts and medications as indicated.  - Educated pt on signs and symptoms of serotonin syndrome and notified pt to go to the ER if experiencing these symptoms.   - Notified pt that antidepressants can sometimes cause worsening SI and to monitor for this.     Treatment and medication options discussed during today's visit. Patient acknowledged and verbally consented to continue with current treatment plan and was educated on the importance of compliance with treatment and follow-up appointments. Patient is agreeable to call the office with any worsening of symptoms or onset of side effects. Patient is agreeable to call 911 or go to the nearest ER should he/she begin having SI/HI.    Jad reviewed and is appropriate.    WINSOME Lr, PMHNP-BC    Part of this note may be an electronic transcription/translation of spoken language to printed text using the Dragon Dictation System.

## 2024-12-12 ENCOUNTER — OFFICE VISIT (OUTPATIENT)
Age: 29
End: 2024-12-12
Payer: COMMERCIAL

## 2024-12-12 VITALS
OXYGEN SATURATION: 96 % | WEIGHT: 159.1 LBS | HEART RATE: 83 BPM | DIASTOLIC BLOOD PRESSURE: 84 MMHG | HEIGHT: 59 IN | SYSTOLIC BLOOD PRESSURE: 116 MMHG | BODY MASS INDEX: 32.08 KG/M2

## 2024-12-12 DIAGNOSIS — F42.2 MIXED OBSESSIONAL THOUGHTS AND ACTS: Primary | ICD-10-CM

## 2024-12-12 DIAGNOSIS — R63.39 FOOD AVERSION: ICD-10-CM

## 2024-12-12 RX ORDER — SERTRALINE HYDROCHLORIDE 100 MG/1
150 TABLET, FILM COATED ORAL DAILY
Qty: 45 TABLET | Refills: 1 | Status: SHIPPED | OUTPATIENT
Start: 2024-12-12 | End: 2025-02-10

## 2024-12-12 NOTE — PROGRESS NOTES
"     Office  Follow Up Visit      Patient Name: Rianna Montano  : 1995   MRN: [id number]     Referring Provider: Marita Cervantes APRN    Chief Complaint:  \"Obsessions and compulsions are still time-consuming\"    ICD-10-CM ICD-9-CM   1. Mixed obsessional thoughts and acts  F42.2 300.3   2. Food aversion  R63.39 783.3      History of Present Illness:   Rianna Montano is a 29 y.o. female who is here today for follow up.  Patient was seen for initial valuation on 2024 was diagnosed with OCD.  Patient has obsessive thoughts and compulsions that take up anywhere from 1 to 3 hours of her day each day.  In general patient's anxiety is pretty manageable is just that the obsessions and compulsions are time-consuming for patient.  Patient is a very picky eater and primarily only eats her \"safe\" foods but patient does not meet criteria for ARFID as she is never had any nutritional deficiencies or malnourishment.  During initial evaluation patient was referred to [facility] for eating disorders for therapy.  Patient came to provider taking Zoloft 50 mg daily and Zoloft was increased to 100 mg daily during initial evaluation.    Today patient reports that her anxiety is still very manageable but her obsessions and compulsions are still time-consuming for her.  Obsessions and compulsions are listed in chart below.  Reports feel depressed at times but states it is not a feeling that last several days in a row.  Reports that after increasing Zoloft to 100 mg daily she did notice increased irritability and headaches but that both of the side effects have resolved.  Patient reports she has not noticed much difference in her obsession and compulsion time consumption since increasing Zoloft.  Reports to be sleeping 5 to 6 hours each night.  Denies any current side effects to medication.  Denies SI/HI/AVH.  No change in appetite.  States that she has started therapy at the [facility] for eating " disorders.    Abuse History: Denies.       11/13/24 1400   Miscellaneouse Obsessions   The need to know or remember Current   Fear of losing things Current   Checking compulsions   Checking that did not make mistake Current   Repeating Rituals   Need to repeat activities (e.g. In / out of doorway, up / down from chair Current   Miscellaneous Compulsions   Rituals other than checking / counting Current   Excessive List Making * Current   Ritualized Eating Behaviors * Current   Other (describe)    (skin picking)        Subjective      Review of Systems:   Review of Systems   Constitutional:  Negative for appetite change.   Respiratory:  Negative for chest tightness and shortness of breath.    Gastrointestinal:  Negative for abdominal pain, constipation, diarrhea, nausea and vomiting.   Genitourinary:  Negative for difficulty urinating.   Neurological:  Negative for headaches.   Psychiatric/Behavioral:  Negative for hallucinations and suicidal ideas.        PHQ-9 Depression Screening  Little interest or pleasure in doing things? Not at all   Feeling down, depressed, or hopeless? Several days   PHQ-2 Total Score 1   Trouble falling or staying asleep, or sleeping too much? Not at all   Feeling tired or having little energy? Not at all   Poor appetite or overeating? Not at all   Feeling bad about yourself - or that you are a failure or have let yourself or your family down? Several days   Trouble concentrating on things, such as reading the newspaper or watching television? Not at all   Moving or speaking so slowly that other people could have noticed? Or the opposite - being so fidgety or restless that you have been moving around a lot more than usual? Not at all   Thoughts that you would be better off dead, or of hurting yourself in some way? Not at all   PHQ-9 Total Score 2   If you checked off any problems, how difficult have these problems made it for you to do your work, take care of things at home, or get along  with other people? Somewhat difficult     ROXANN-7      Over the last two weeks, how often have you been bothered by the following problems?  Feeling nervous, anxious or on edge: Several days  Not being able to stop or control worrying: Several days  Worrying too much about different things: Several days  Trouble Relaxing: Not at all  Being so restless that it is hard to sit still: Several days  Becoming easily annoyed or irritable: More than half the days  Feeling afraid as if something awful might happen: Not at all  ROXANN 7 Total Score: 6  If you checked any problems, how difficult have these problems made it for you to do your work, take care of things at home, or get along with other people: Somewhat difficult    Patient History:   The following portions of the patient's history were reviewed and updated as appropriate: allergies, current medications, past family history, past medical history, past social history, past surgical history and problem list.     Social History     Socioeconomic History    Marital status: Single   Tobacco Use    Smoking status: Never    Smokeless tobacco: Never   Vaping Use    Vaping status: Never Used   Substance and Sexual Activity    Alcohol use: No    Drug use: No    Sexual activity: Never     Birth control/protection: I.U.D.     Medications:     Current Outpatient Medications:     Multiple Vitamins-Minerals (Multivitamin Adult) chewable tablet, Chew., Disp: , Rfl:     Ruxolitinib Phosphate (Opzelura) 1.5 % cream, Apply 1 Application topically Daily. Indications: Atopic Dermatitis, Disp: 60 g, Rfl: 3    spironolactone (ALDACTONE) 50 MG tablet, Take 1 tablet by mouth Daily., Disp: , Rfl:     sertraline (Zoloft) 100 MG tablet, Take 1.5 tablets by mouth Daily for 60 days., Disp: 45 tablet, Rfl: 1    tacrolimus (Protopic) 0.1 % ointment, Apply 1 Application topically to the appropriate area as directed 2 (Two) Times a Day. Indications: Atopic Dermatitis (Patient not taking: Reported on  "12/12/2024), Disp: 100 g, Rfl: 3    Objective     Physical Exam:  Vital Signs:   Vitals:    12/12/24 1304   BP: 116/84   Pulse: 83   SpO2: 96%   Weight: 72.2 kg (159 lb 1.6 oz)   Height: 149.2 cm (58.75\")     Body mass index is 32.41 kg/m².       Mental Status Exam:   MENTAL STATUS EXAM   General Appearance:  Cleanly groomed and dressed  Eye Contact:  Good eye contact  Attitude:  Cooperative  Motor Activity:  Normal gait, posture  Muscle Strength:  Normal  Speech:  Normal rate, tone, volume  Language:  Spontaneous  Mood and affect:  Normal, pleasant  Hopelessness:  Denies  Loneliness: Denies  Thought Process:  Logical  Associations/ Thought Content:  No delusions  Hallucinations:  None  Suicidal Ideations:  Not present  Homicidal Ideation:  Not present  Sensorium:  Alert and clear  Orientation:  Person, time, place and situation  Attention Span/ Concentration:  Good  Fund of Knowledge:  Appropriate for age and educational level  Intellectual Functioning:  Average range  Insight:  Good  Judgement:  Good  Reliability:  Good  Impulse Control:  Good       @RESULASTCBCDIFFPANEL,TSH,LABLIPI,HHGQLDPP59,KCEKISIQ65,MG,FOLATE,PROLACTIN,CRPRESULT,CMP,I2YLESCPCED)@    Lab Results   Component Value Date    GLUCOSE 93 10/25/2024    BUN 16 10/25/2024    CREATININE 0.71 10/25/2024    EGFRRESULT 119 10/25/2024    BCR 23 10/25/2024    K 4.3 10/25/2024    CO2 19 (L) 10/25/2024    CALCIUM 9.8 10/25/2024    PROTENTOTREF 7.2 10/25/2024    ALBUMIN 4.4 10/25/2024    BILITOT 0.2 10/25/2024    AST 24 10/25/2024    ALT 30 10/25/2024       Lab Results   Component Value Date    WBC 7.9 10/25/2024    HGB 14.6 10/25/2024    HCT 44.7 10/25/2024    MCV 82 10/25/2024     10/25/2024       Lab Results   Component Value Date    CHOL 223 (A) 03/23/2022    CHLPL 200 (H) 10/25/2024    TRIG 93 10/25/2024    HDL 65 10/25/2024     (H) 10/25/2024           TSH          10/25/2024    10:35   TSH   TSH 3.150           Assessment / Plan  " "    Assessment:  Patient is a 29-year-old female. Patient was seen for initial valuation on 11/13/2024 was diagnosed with OCD.  Patient has obsessive thoughts and compulsions that take up anywhere from 1 to 3 hours of her day each day.  In general patient's anxiety is pretty manageable is just that the obsessions and compulsions are time-consuming for patient.  Patient is a very picky eater and primarily only eats her \"safe\" foods but patient does not meet criteria for ARFID as she is never had any nutritional deficiencies or malnourishment.  During initial evaluation patient was referred to [facility] for eating disorders for therapy.  Patient came to provider taking Zoloft 50 mg daily and Zoloft was increased to 100 mg daily during initial evaluation.  Today patient reports that her anxiety remains manageable but has not noticed much of a difference in her time consumption with her obsessions and her compulsions since increasing the Zoloft.  Will increase Zoloft to 150 mg daily as some patients with OCD need higher doses to see improvement.  Encouraged patient to continue with therapy at the [facility] for eating disorders.  Will see patient in 6 weeks.    Diagnoses and all orders for this visit:    1. Mixed obsessional thoughts and acts (Primary)    2. Food aversion    Other orders  -     sertraline (Zoloft) 100 MG tablet; Take 1.5 tablets by mouth Daily for 60 days.  Dispense: 45 tablet; Refill: 1       Plan/patient education:   Increase Zoloft to 150 mg daily.  Discussed medication options and treatment plan of prescribed medication as well as the risks, benefits, and side effects   Encouraged pt to continue supportive psychotherapy efforts and medications as indicated.  Educated pt on signs and symptoms of serotonin syndrome and notified pt to go to the ER if experiencing these symptoms.   Notified pt that antidepressants can sometimes cause worsening SI and to monitor for this.     Short-term " goals: Continue to develop rapport with patient.     Long-term goals: Symptom reduction with medication and therapy.     Weaknesses: Picky eating.     Strengths: Great support from family.    Continue supportive psychotherapy efforts and medications as indicated. Treatment and medication options discussed during today's visit. Patient ackowledged and verbally consented to continue with current treatment plan and was educated on the importance of compliance with treatment and follow-up appointments. Patient seems reasonably able to adhere to treatment plan.      Medication Considerations:  Discussed medication options and treatment plan of prescribed medication(s) as well as the risks, benefits, and potential side effects. Patient is agreeable to call the office with any worsening of symptoms or onset of side effects. Patient is agreeable to call 911 or go to the nearest ER should he/she begin having SI/HI.    Quality Measures:   Patient does not use tobacco products.    Jad reviewed and is appropriate.    Follow Up:   Return in about 6 weeks (around 1/23/2025) for Recheck.    Copied text in this note has been reviewed and is accurate as of 12/12/2024.    Part of this note may be an electronic transcription/translation of spoken language to printed text using the Dragon Dictation System.    WINSOME Lr, PMHNP-BC

## 2025-01-09 ENCOUNTER — TELEPHONE (OUTPATIENT)
Age: 30
End: 2025-01-09
Payer: COMMERCIAL

## 2025-01-23 ENCOUNTER — OFFICE VISIT (OUTPATIENT)
Age: 30
End: 2025-01-23
Payer: COMMERCIAL

## 2025-01-23 VITALS
SYSTOLIC BLOOD PRESSURE: 133 MMHG | HEIGHT: 59 IN | OXYGEN SATURATION: 95 % | DIASTOLIC BLOOD PRESSURE: 69 MMHG | BODY MASS INDEX: 32.05 KG/M2 | HEART RATE: 88 BPM | WEIGHT: 159 LBS

## 2025-01-23 DIAGNOSIS — F42.2 MIXED OBSESSIONAL THOUGHTS AND ACTS: Primary | ICD-10-CM

## 2025-01-23 DIAGNOSIS — R63.39 FOOD AVERSION: ICD-10-CM

## 2025-01-23 RX ORDER — SERTRALINE HYDROCHLORIDE 100 MG/1
200 TABLET, FILM COATED ORAL DAILY
Qty: 60 TABLET | Refills: 1 | Status: SHIPPED | OUTPATIENT
Start: 2025-01-23 | End: 2025-03-24

## 2025-01-23 NOTE — PROGRESS NOTES
"     Office  Follow Up Visit      Patient Name: Rianna Montano  : 1995   MRN: [id number]     Referring Provider: Marita Cervantes APRN    Chief Complaint:  \"My compulsions are still time-consuming\"    ICD-10-CM ICD-9-CM   1. Mixed obsessional thoughts and acts  F42.2 300.3   2. Food aversion  R63.39 783.3      History of Present Illness:   Rianna Montano is a 29 y.o. female who is here today for follow up. Patient was seen for initial evaluation on 2024 was diagnosed with OCD.  Patient has obsessive thoughts and compulsions that take up anywhere from 1 to 3 hours of her day each day.  In general patient's anxiety is pretty manageable is just that the obsessions and compulsions are time-consuming for patient.  Patient is a very picky eater and primarily only eats her \"safe\" foods but patient does not meet criteria for ARFID as she is never had any nutritional deficiencies or malnourishment.  During initial evaluation patient was referred to [facility] for eating disorders for therapy.  Patient came to provider taking Zoloft 50 mg daily and Zoloft was increased to 100 mg daily during initial evaluation.  During patient's last visit on 2024 patient's Zoloft was increased to 100 mg daily to hopefully improve obsessions and compulsions.    Today patient reports to be doing well.  States that her general anxiety doing well but states that she still does find her compulsions to be time-consuming.  States at times the compulsions do make her late.  States that exposure therapy at the eating disorder clinic is going really well.  States that she has tried new foods and has expanded her palate.  Reports that she still has days where she feels down but states that she is usually happy person.  Reports to sleep 5 to 6 hours each night but does get more sleep on weekends.  Denies any side effects to her medications.  No change in appetite.  Denies SI/HI/AVH.    Subjective      Review of " Systems:   Review of Systems   Constitutional:  Negative for appetite change.   Respiratory:  Negative for chest tightness and shortness of breath.    Gastrointestinal:  Negative for abdominal pain, constipation, diarrhea, nausea and vomiting.   Genitourinary:  Negative for difficulty urinating.   Neurological:  Negative for headaches.   Psychiatric/Behavioral:  Negative for hallucinations and suicidal ideas.        PHQ-9 Depression Screening  Little interest or pleasure in doing things? Not at all   Feeling down, depressed, or hopeless? Several days   PHQ-2 Total Score 1   Trouble falling or staying asleep, or sleeping too much? Not at all   Feeling tired or having little energy? Not at all   Poor appetite or overeating? Not at all   Feeling bad about yourself - or that you are a failure or have let yourself or your family down? Not at all   Trouble concentrating on things, such as reading the newspaper or watching television? Not at all   Moving or speaking so slowly that other people could have noticed? Or the opposite - being so fidgety or restless that you have been moving around a lot more than usual? Not at all   Thoughts that you would be better off dead, or of hurting yourself in some way? Not at all   PHQ-9 Total Score 1   If you checked off any problems, how difficult have these problems made it for you to do your work, take care of things at home, or get along with other people? Not difficult at all         ROXANN-7      Over the last two weeks, how often have you been bothered by the following problems?  Feeling nervous, anxious or on edge: Several days  Not being able to stop or control worrying: Several days  Worrying too much about different things: Several days  Trouble Relaxing: Not at all  Being so restless that it is hard to sit still: Not at all  Becoming easily annoyed or irritable: Several days  Feeling afraid as if something awful might happen: Not at all  ROXANN 7 Total Score: 4  If you checked  "any problems, how difficult have these problems made it for you to do your work, take care of things at home, or get along with other people: Somewhat difficult    Patient History:   The following portions of the patient's history were reviewed and updated as appropriate: allergies, current medications, past family history, past medical history, past social history, past surgical history and problem list.     Social History     Socioeconomic History    Marital status: Single   Tobacco Use    Smoking status: Never    Smokeless tobacco: Never   Vaping Use    Vaping status: Never Used   Substance and Sexual Activity    Alcohol use: No    Drug use: No    Sexual activity: Never     Birth control/protection: I.U.D.       Medications:     Current Outpatient Medications:     Multiple Vitamins-Minerals (Multivitamin Adult) chewable tablet, Chew., Disp: , Rfl:     Ruxolitinib Phosphate (Opzelura) 1.5 % cream, Apply 1 Application topically Daily. Indications: Atopic Dermatitis, Disp: 60 g, Rfl: 3    spironolactone (ALDACTONE) 50 MG tablet, Take 1 tablet by mouth Daily., Disp: , Rfl:     sertraline (Zoloft) 100 MG tablet, Take 2 tablets by mouth Daily for 60 days., Disp: 60 tablet, Rfl: 1    tacrolimus (Protopic) 0.1 % ointment, Apply 1 Application topically to the appropriate area as directed 2 (Two) Times a Day. Indications: Atopic Dermatitis (Patient not taking: Reported on 1/23/2025), Disp: 100 g, Rfl: 3    Objective     Physical Exam:  Vital Signs:   Vitals:    01/23/25 1309   BP: 133/69   Pulse: 88   SpO2: 95%   Weight: 72.1 kg (159 lb)   Height: 149.2 cm (58.74\")     Body mass index is 32.4 kg/m².       Mental Status Exam:   MENTAL STATUS EXAM   General Appearance:  Cleanly groomed and dressed  Eye Contact:  Good eye contact  Attitude:  Cooperative  Motor Activity:  Normal gait, posture  Muscle Strength:  Normal  Speech:  Normal rate, tone, volume  Language:  Spontaneous  Mood and affect:  Normal, pleasant  Hopelessness: "  Denies  Loneliness: Denies  Thought Process:  Logical  Associations/ Thought Content:  No delusions  Hallucinations:  None  Suicidal Ideations:  Not present  Homicidal Ideation:  Not present  Sensorium:  Alert and clear  Orientation:  Person, place, time and situation  Attention Span/ Concentration:  Good  Fund of Knowledge:  Appropriate for age and educational level  Intellectual Functioning:  Average range  Insight:  Good  Judgement:  Good  Reliability:  Good  Impulse Control:  Good       @RESULASTCBCDIFFPANEL,TSH,LABLIPI,FVUCMBKT14,SXKOREJF95,MG,FOLATE,PROLACTIN,CRPRESULT,CMP,[id number])@    Lab Results   Component Value Date    GLUCOSE 93 10/25/2024    BUN 16 10/25/2024    CREATININE 0.71 10/25/2024    EGFRRESULT 119 10/25/2024    BCR 23 10/25/2024    K 4.3 10/25/2024    CO2 19 (L) 10/25/2024    CALCIUM 9.8 10/25/2024    PROTENTOTREF 7.2 10/25/2024    ALBUMIN 4.4 10/25/2024    BILITOT 0.2 10/25/2024    AST 24 10/25/2024    ALT 30 10/25/2024       Lab Results   Component Value Date    WBC 7.9 10/25/2024    HGB 14.6 10/25/2024    HCT 44.7 10/25/2024    MCV 82 10/25/2024     10/25/2024       Lab Results   Component Value Date    CHOL 223 (A) 03/23/2022    CHLPL 200 (H) 10/25/2024    TRIG 93 10/25/2024    HDL 65 10/25/2024     (H) 10/25/2024           TSH          10/25/2024    10:35   TSH   TSH 3.150           Assessment / Plan      Assessment:  Patient is a 29-year-old female with a history of OCD and food inversions.  Patient came to provider during this evaluation taking Zoloft 50 mg daily.  Over the past couple visits patient's Zoloft has been increased to 150 mg daily.  Patient reports that therapy at the disorder clinic is going really well and has started exposure therapy.  Today patient reports improvement in her general anxiety but states that her compulsions are still time-consuming for her.  Will increase patient Zoloft to 200 mg daily to hopefully improve obsessive thoughts and  compulsions.  Encourage patient to continue therapy.  Will see patient in 6 weeks.  Suggested to patient that she start therapy in clinic that is more focused towards her anxiety/OCD or if her therapist at the eating disorder clinic could also do therapy that is OCD focused.    Diagnoses and all orders for this visit:    1. Mixed obsessional thoughts and acts (Primary)    2. Food aversion    Other orders  -     sertraline (Zoloft) 100 MG tablet; Take 2 tablets by mouth Daily for 60 days.  Dispense: 60 tablet; Refill: 1       Plan/patient education:   Increase Zoloft to 200 mg daily.  Discussed medication options and treatment plan of prescribed medication as well as the risks, benefits, and side effects   Encouraged pt to continue supportive psychotherapy efforts and medications as indicated.  Educated pt on signs and symptoms of serotonin syndrome and notified pt to go to the ER if experiencing these symptoms.   Notified pt that antidepressants can sometimes cause worsening SI and to monitor for this.      Short-term goals: Continue to develop rapport with patient.     Long-term goals: Symptom reduction with medication and therapy.     Weaknesses: Picky eating.     Strengths: Great support from family.    Continue supportive psychotherapy efforts and medications as indicated. Treatment and medication options discussed during today's visit. Patient ackowledged and verbally consented to continue with current treatment plan and was educated on the importance of compliance with treatment and follow-up appointments. Patient seems reasonably able to adhere to treatment plan.      Medication Considerations:  Discussed medication options and treatment plan of prescribed medication(s) as well as the risks, benefits, and potential side effects. Patient is agreeable to call the office with any worsening of symptoms or onset of side effects. Patient is agreeable to call 911 or go to the nearest ER should he/she begin having  SI/HI.    Quality Measures:   Patient does not use tobacco products.     Jad reviewed and is appropriate.    Follow Up:   Return in about 6 weeks (around 3/6/2025) for Recheck.    Copied text in this note has been reviewed and is accurate as of 1/25/2025.    Part of this note may be an electronic transcription/translation of spoken language to printed text using the Dragon Dictation System.    WINSOME Lr, PMHNP-BC

## 2025-03-03 NOTE — TELEPHONE ENCOUNTER
Rx Refill Note  Requested Prescriptions     Pending Prescriptions Disp Refills    sertraline (ZOLOFT) 100 MG tablet [Pharmacy Med Name: SERTRALINE  MG TABLET] 45 tablet 1     Sig: TAKE 1.5 TABLETS BY MOUTH DAILY FOR 60 DAYS.      Last office visit with prescribing clinician: 1/23/2025   Last telemedicine visit with prescribing clinician: Visit date not found   Next office visit with prescribing clinician: 3/5/2025     Agustina Christie  03/03/25, 08:36 EST

## 2025-03-04 RX ORDER — SERTRALINE HYDROCHLORIDE 100 MG/1
TABLET, FILM COATED ORAL
Qty: 45 TABLET | Refills: 1 | Status: SHIPPED | OUTPATIENT
Start: 2025-03-04 | End: 2025-03-05 | Stop reason: SDUPTHER

## 2025-03-05 ENCOUNTER — OFFICE VISIT (OUTPATIENT)
Age: 30
End: 2025-03-05
Payer: COMMERCIAL

## 2025-03-05 VITALS
HEART RATE: 107 BPM | BODY MASS INDEX: 33.37 KG/M2 | OXYGEN SATURATION: 97 % | DIASTOLIC BLOOD PRESSURE: 65 MMHG | HEIGHT: 58 IN | WEIGHT: 159 LBS | SYSTOLIC BLOOD PRESSURE: 128 MMHG

## 2025-03-05 DIAGNOSIS — R63.39 FOOD AVERSION: ICD-10-CM

## 2025-03-05 DIAGNOSIS — F42.2 MIXED OBSESSIONAL THOUGHTS AND ACTS: Primary | ICD-10-CM

## 2025-03-05 RX ORDER — SERTRALINE HYDROCHLORIDE 100 MG/1
200 TABLET, FILM COATED ORAL DAILY
Qty: 180 TABLET | Refills: 0 | Status: SHIPPED | OUTPATIENT
Start: 2025-03-05 | End: 2025-06-03

## 2025-03-05 NOTE — PROGRESS NOTES
"     Office  Follow Up Visit      Patient Name: Rianna Montano  : 1995   MRN: [id number]     Referring Provider: Marita Cervantes APRN    Chief Complaint:  \"I have been doing well\"     ICD-10-CM ICD-9-CM   1. Mixed obsessional thoughts and acts  F42.2 300.3   2. Food aversion  R63.39 783.3      History of Present Illness:   Rianna Montano is a 29 y.o. female who is here today for follow up. Patient was seen for initial evaluation on 2024 was diagnosed with OCD.  Patient has obsessive thoughts and compulsions that take up anywhere from 1 to 3 hours of her day each day.  In general patient's anxiety is pretty manageable is just that the obsessions and compulsions are time-consuming for patient.  Patient is a very picky eater and primarily only eats her \"safe\" foods but patient does not meet criteria for ARFID as she is never had any nutritional deficiencies or malnourishment.  During initial evaluation patient was referred to [facility] for eating disorders for therapy.  Patient came to provider taking Zoloft 50 mg daily and over the past couple of months her dose has been increased to 200mg.  Dose was most recently increased to 200 mg daily during her last appointment on 2025 to further improve OCD symptoms.  Patient does do therapy at [facility] for eating disorders where she does exposure therapy.    Today pt reports to be doing well. Her OCD symptoms consume less of her time now (30mins-1hour of her day). States she is anxious at times but it is manageable and has some days where her mood is low but has more good days than bad days. Denies any side effects to her medications. Appetite is good. States therapy at [facility] for eating disorders is going well and has added more \"safe foods\" to her life. Reports to sleep 5-7 hours each night. Denies SI/HI/AVH.     Subjective      Review of Systems:   Review of Systems   Constitutional:  Negative for appetite change. "   Respiratory:  Negative for chest tightness and shortness of breath.    Gastrointestinal:  Negative for abdominal pain, constipation, diarrhea, nausea and vomiting.   Genitourinary:  Negative for difficulty urinating.   Neurological:  Negative for headaches.   Psychiatric/Behavioral:  Negative for hallucinations and suicidal ideas.      PHQ-9 Depression Screening  Little interest or pleasure in doing things? Not at all   Feeling down, depressed, or hopeless? Several days   PHQ-2 Total Score 1   Trouble falling or staying asleep, or sleeping too much? Not at all   Feeling tired or having little energy? Not at all   Poor appetite or overeating? Not at all   Feeling bad about yourself - or that you are a failure or have let yourself or your family down? Not at all   Trouble concentrating on things, such as reading the newspaper or watching television? Not at all   Moving or speaking so slowly that other people could have noticed? Or the opposite - being so fidgety or restless that you have been moving around a lot more than usual? Not at all   Thoughts that you would be better off dead, or of hurting yourself in some way? Not at all   PHQ-9 Total Score 1   If you checked off any problems, how difficult have these problems made it for you to do your work, take care of things at home, or get along with other people? Not difficult at all     ROXANN-7      Over the last two weeks, how often have you been bothered by the following problems?  Feeling nervous, anxious or on edge: Several days  Not being able to stop or control worrying: Not at all  Worrying too much about different things: Several days  Trouble Relaxing: Not at all  Being so restless that it is hard to sit still: Not at all  Becoming easily annoyed or irritable: Several days  Feeling afraid as if something awful might happen: Not at all  ROXANN 7 Total Score: 3  If you checked any problems, how difficult have these problems made it for you to do your work, take  "care of things at home, or get along with other people: Not difficult at all    Patient History:   The following portions of the patient's history were reviewed and updated as appropriate: allergies, current medications, past family history, past medical history, past social history, past surgical history and problem list.     Social History     Socioeconomic History    Marital status: Single   Tobacco Use    Smoking status: Never    Smokeless tobacco: Never   Vaping Use    Vaping status: Never Used   Substance and Sexual Activity    Alcohol use: No    Drug use: No    Sexual activity: Never     Birth control/protection: I.U.D.       Medications:     Current Outpatient Medications:     sertraline (ZOLOFT) 100 MG tablet, Take 2 tablets by mouth Daily for 90 days., Disp: 180 tablet, Rfl: 0    Multiple Vitamins-Minerals (Multivitamin Adult) chewable tablet, Chew., Disp: , Rfl:     Ruxolitinib Phosphate (Opzelura) 1.5 % cream, Apply 1 Application topically Daily. Indications: Atopic Dermatitis, Disp: 60 g, Rfl: 3    spironolactone (ALDACTONE) 50 MG tablet, Take 1 tablet by mouth Daily., Disp: , Rfl:     tacrolimus (Protopic) 0.1 % ointment, Apply 1 Application topically to the appropriate area as directed 2 (Two) Times a Day. Indications: Atopic Dermatitis (Patient not taking: Reported on 1/23/2025), Disp: 100 g, Rfl: 3    Objective     Physical Exam:  Vital Signs:   Vitals:    03/05/25 1310   BP: 128/65   Pulse: 107   SpO2: 97%   Weight: 72.1 kg (159 lb)   Height: 147.3 cm (58\")     Body mass index is 33.23 kg/m².     Mental Status Exam:   MENTAL STATUS EXAM   General Appearance:  Cleanly groomed and dressed  Eye Contact:  Good eye contact  Attitude:  Cooperative  Motor Activity:  Normal gait, posture  Muscle Strength:  Normal  Speech:  Normal rate, tone, volume  Language:  Spontaneous  Mood and affect:  Normal, pleasant  Hopelessness:  Denies  Loneliness: Denies  Thought Process:  Logical  Associations/ Thought " Content:  No delusions  Hallucinations:  None  Suicidal Ideations:  Not present  Homicidal Ideation:  Not present  Sensorium:  Alert and clear  Orientation:  Person, place, time and situation  Attention Span/ Concentration:  Good  Fund of Knowledge:  Appropriate for age and educational level  Intellectual Functioning:  Average range  Insight:  Good  Judgement:  Good  Reliability:  Good  Impulse Control:  Good       @RESULASTCBCDIFFPANEL,TSH,LABLIPI,TLGBEPJX95,SOTZEBDO94,MG,FOLATE,PROLACTIN,CRPRESULT,CMP,J3DPUBOPYBK)@    Lab Results   Component Value Date    GLUCOSE 93 10/25/2024    BUN 16 10/25/2024    CREATININE 0.71 10/25/2024    EGFR 119 10/25/2024    BCR 23 10/25/2024    K 4.3 10/25/2024    CO2 19 (L) 10/25/2024    CALCIUM 9.8 10/25/2024    ALBUMIN 4.4 10/25/2024    BILITOT 0.2 10/25/2024    AST 24 10/25/2024    ALT 30 10/25/2024       Lab Results   Component Value Date    WBC 7.9 10/25/2024    HGB 14.6 10/25/2024    HCT 44.7 10/25/2024    MCV 82 10/25/2024     10/25/2024       Lab Results   Component Value Date    CHOL 223 (A) 03/23/2022    CHLPL 200 (H) 10/25/2024    TRIG 93 10/25/2024    HDL 65 10/25/2024     (H) 10/25/2024       TSH          10/25/2024    10:35   TSH   TSH 3.150           Assessment / Plan      Assessment:   Patient is a 29-year-old female with a history of OCD and food aversion.  Patient was last seen on 1/23/2025 when her Zoloft was increased to 200 mg daily to further improve OCD symptoms.  Today patient reports to be doing well.  Patient's PHQ-9 and ROXANN-7 scores are low today.  Patient states that she is spending less time throughout her day on her OCD tendencies and has become more manageable for her.  Given that patient has had a good result with this dose, we will continue Zoloft 200 mg daily.  Encouraged patient to continue therapy with [facility] for eating disorders for exposure therapy.  Will see patient in 3 months.    Diagnoses and all orders for this  visit:    1. Mixed obsessional thoughts and acts (Primary)    2. Food aversion    Other orders  -     sertraline (ZOLOFT) 100 MG tablet; Take 2 tablets by mouth Daily for 90 days.  Dispense: 180 tablet; Refill: 0       Plan/patient education:   Continue Zoloft 200mg daily.   Continue with therapy at [facility] for eating disorders.  Discussed medication options and treatment plan of prescribed medication as well as the risks, benefits, and side effects   Encouraged pt to continue supportive psychotherapy efforts and medications as indicated.  Educated pt on signs and symptoms of serotonin syndrome and notified pt to go to the ER if experiencing these symptoms.   Notified pt that antidepressants can sometimes cause worsening SI and to monitor for this.      Short-term goals: Continue to develop rapport with patient.     Long-term goals: Symptom reduction with medication and therapy.     Weaknesses: Picky eating.     Strengths: Great support from family.    Continue supportive psychotherapy efforts and medications as indicated. Treatment and medication options discussed during today's visit. Patient ackowledged and verbally consented to continue with current treatment plan and was educated on the importance of compliance with treatment and follow-up appointments. Patient seems reasonably able to adhere to treatment plan.      Medication Considerations:  Discussed medication options and treatment plan of prescribed medication(s) as well as the risks, benefits, and potential side effects. Patient is agreeable to call the office with any worsening of symptoms or onset of side effects. Patient is agreeable to call 911 or go to the nearest ER should he/she begin having SI/HI.    Quality Measures:   Patient does not use tobacco products.    Jad reviewed and is appropriate.    Follow Up:   Return in about 3 months (around 6/5/2025) for Recheck.    Copied text in this note has been reviewed and is accurate as of  3/5/2025.    Part of this note may be an electronic transcription/translation of spoken language to printed text using the Dragon Dictation System.    WINSOME Lr, PMHNP-BC

## 2025-04-21 ENCOUNTER — OFFICE VISIT (OUTPATIENT)
Dept: FAMILY MEDICINE CLINIC | Facility: CLINIC | Age: 30
End: 2025-04-21
Payer: COMMERCIAL

## 2025-04-21 VITALS
HEIGHT: 58 IN | HEART RATE: 80 BPM | WEIGHT: 158.1 LBS | DIASTOLIC BLOOD PRESSURE: 80 MMHG | TEMPERATURE: 97.5 F | OXYGEN SATURATION: 98 % | SYSTOLIC BLOOD PRESSURE: 110 MMHG | BODY MASS INDEX: 33.19 KG/M2

## 2025-04-21 DIAGNOSIS — R73.03 PREDIABETES: ICD-10-CM

## 2025-04-21 DIAGNOSIS — R63.39 FOOD AVERSION: ICD-10-CM

## 2025-04-21 DIAGNOSIS — I49.9 IRREGULAR HEART BEAT: ICD-10-CM

## 2025-04-21 DIAGNOSIS — E28.2 PCOS (POLYCYSTIC OVARIAN SYNDROME): ICD-10-CM

## 2025-04-21 DIAGNOSIS — E78.00 HYPERCHOLESTEROLEMIA: ICD-10-CM

## 2025-04-21 DIAGNOSIS — F42.9 OBSESSIVE-COMPULSIVE DISORDER, UNSPECIFIED TYPE: Primary | ICD-10-CM

## 2025-04-21 PROCEDURE — 99214 OFFICE O/P EST MOD 30 MIN: CPT

## 2025-04-21 PROCEDURE — 93000 ELECTROCARDIOGRAM COMPLETE: CPT

## 2025-04-21 RX ORDER — SPIRONOLACTONE 100 MG/1
100 TABLET, FILM COATED ORAL DAILY
COMMUNITY

## 2025-04-21 NOTE — PROGRESS NOTES
Patient or patient representative verbalized consent for the use of Ambient Listening during the visit with  WINSOME Fox for chart documentation. 4/21/2025  17:20 EDT    Chief Complaint   Patient presents with    Anxiety       Subjective      Patient ID: Rianna is a 29 y.o. female.     Chief Complaint   Patient presents with    Anxiety        Anxiety         History of Present Illness  The patient presents for follow-up of obsessive-compulsive disorder, food aversion, polycystic ovarian syndrome, and prediabetes.    She was diagnosed with obsessive-compulsive disorder by Dr. Guido, who subsequently increased her Zoloft dosage from 50 mg to 100mg, then 150mg, then 200 mg. This adjustment has resulted in a significant reduction in her OCD tendencies.    She has been referred to [facility] for food therapy, where she has been experimenting with various foods under the guidance of her therapist Juan F. Initially, these sessions were weekly but have now been reduced to biweekly. Her food aversion is primarily related to texture, although taste also plays a role. She has shown a preference for vegetables over fruits, with broccoli, celery, and peppers being well-tolerated. However, she finds the taste of these vegetables lingering. Blueberries are too sour for her palate, and she has difficulty with the skins of certain fruits. Despite this, she enjoys apples and grapes, and the latter's skin does not bother her. Pineapple triggers her gag reflex, but she is able to consume it. She has recently added celery, broccoli, peppers, and avocado to her diet, which previously consisted mainly of carrots.    She is currently on spironolactone 100 mg for PCOS, but its effectiveness in managing her hair growth is uncertain. She reports a decrease in the frequency of shaving but continues to experience irregular menstrual cycles. She sought gynecological consultation after a period of amenorrhea lasting 3 to 4  months and she had an IUD inserted in 2024 due to prolonged menstrual bleeding, which ceased after the procedure.  She reports no abnormal vaginal discharge, odors, or itching. She occasionally experiences spotting.    She is still prediabetic, with mildly elevated cholesterol levels noted in her last lab results from 10/2024.    She reports no ankle swelling.    GYNECOLOGICAL HISTORY:  - Frequency and Flow: Irregular, occasional spotting    PAST SURGICAL HISTORY:  - IUD insertion: 2024       The following portions of the patient's history were reviewed and updated as appropriate: allergies, current medications, past family history, past medical history, past social history, past surgical history, and problem list.      Current Outpatient Medications:     Multiple Vitamins-Minerals (Multivitamin Adult) chewable tablet, Chew., Disp: , Rfl:     Ruxolitinib Phosphate (Opzelura) 1.5 % cream, Apply 1 Application topically Daily. Indications: Atopic Dermatitis, Disp: 60 g, Rfl: 3    sertraline (ZOLOFT) 100 MG tablet, Take 2 tablets by mouth Daily for 90 days., Disp: 180 tablet, Rfl: 0    spironolactone (ALDACTONE) 100 MG tablet, Take 1 tablet by mouth Daily., Disp: , Rfl:     tacrolimus (Protopic) 0.1 % ointment, Apply 1 Application topically to the appropriate area as directed 2 (Two) Times a Day. Indications: Atopic Dermatitis (Patient taking differently: Apply 1 Application topically to the appropriate area as directed 2 (Two) Times a Day.), Disp: 100 g, Rfl: 3        Results  The following results are independently reviewed and interpreted by myself.    Labs   - Cholesterol level: 10/2024, Mildly elevated   - Blood glucose test: 10/2024, Prediabetic   - Iron levels: 10/2024, Normal   - Hepatitis C screening: 10/2024, Negative    Diagnostic Testing   - EK2025, Bradycardia, 59 bpm, no prolonged QT interval        Objective      /80   Pulse 80   Temp 97.5 °F (36.4 °C) (Infrared)   Ht 147.3 cm  "(58\")   Wt 71.7 kg (158 lb 1.6 oz)   SpO2 98%   BMI 33.04 kg/m²      Body mass index is 33.04 kg/m².           Physical Exam  Vitals reviewed.   Constitutional:       General: She is not in acute distress.     Appearance: Normal appearance. She is obese.   Eyes:      Pupils: Pupils are equal, round, and reactive to light.   Neck:      Vascular: No carotid bruit or JVD.   Cardiovascular:      Rate and Rhythm: Normal rate and regular rhythm.      Pulses: Normal pulses.           Radial pulses are 2+ on the right side and 2+ on the left side.        Posterior tibial pulses are 2+ on the right side and 2+ on the left side.      Heart sounds: Normal heart sounds. No murmur heard.     No friction rub.   Pulmonary:      Effort: Pulmonary effort is normal. No respiratory distress.      Breath sounds: Normal breath sounds. No wheezing or rales.   Musculoskeletal:      Cervical back: Neck supple.      Right lower leg: No edema.      Left lower leg: No edema.   Neurological:      General: No focal deficit present.      Mental Status: She is alert.   Psychiatric:         Mood and Affect: Mood normal.         Speech: Speech normal.         Behavior: Behavior normal.         Thought Content: Thought content normal.         Judgment: Judgment normal.          Physical Exam          ECG 12 Lead    Date/Time: 4/21/2025 5:22 PM  Performed by: Marita Cervantes APRN    Authorized by: Marita Cervantes APRN  Comparison: compared with previous ECG from 6/20/2017  Similar to previous ECG  Rhythm: sinus bradycardia and sinus arrhythmia  Rate: bradycardic  BPM: 59  Conduction: conduction normal  ST Segments: ST segments normal  T Waves: T waves normal  QRS axis: normal    Clinical impression: non-specific ECG  Comments: EKG with bradycardia with ventricular rate of 59 bpm showing sinus bradycardia with marked sinus arrhythmia that is the same as EKG recorded in 2017.  No prolonged QT interval noted.          Assessment & " Plan  1. Obsessive-Compulsive Disorder (OCD).    2. Food Aversion.    3. Polycystic Ovary Syndrome (PCOS).    4. Prediabetes.    5. Bradycardia.    Follow-up  - Follow up in 6 months for an annual physical with fasting labs.    Assessment & Plan       1. Obsessive-compulsive disorder, unspecified type  - Currently taking 200 mg of Zoloft, which has been effective in reducing OCD tendencies.  - EKG performed today due to irregular heartbeat on exam and higher dose of Zoloft, which can affect the QT interval.  - EKG showed bradycardia at 59 bpm with irregular heartbeat but no prolonged QT interval.  - Annual EKGs will be conducted to monitor for any side effects from the medication.    2. Irregular heart beat  - EKG showed bradycardia at 59 bpm with irregular heartbeat but no prolonged QT interval.  This was similar to previous EKG in 2017.  - Condition will be monitored annually due to the use of Zoloft.  - ECG 12 Lead    3. Food aversion  - [facility], focusing on exposure to different textures and tastes.  - Better success with vegetables like broccoli, celery, and peppers compared to fruits.  - Advised to continue incorporating a variety of vegetables into the diet and to drink fluids to help with the taste.  - Emphasized the importance of a balanced diet.    4. PCOS (polycystic ovarian syndrome)  - On spironolactone 100 mg for PCOS, which has helped reduce facial hair due to hirsutism.  - IUD (Kyleena) placed in 06/2024, regulating periods with occasional spotting.  - Advised to continue regular OB/GYN appointments.    5. Prediabetes  - Labs from 10/2024 indicate prediabetes with mildly elevated cholesterol levels.  - Advised to maintain a less restrictive diet with healthy nutrient sources.  - Metformin will be considered if A1c levels increase, aiding in weight loss and managing insulin resistance associated with PCOS.    6. Hypercholesterolemia  Chronic, mildly elevated.   Healthy diet low in saturated fats discussed.  Will monitor annually for the time being.       Return in about 6 months (around 10/21/2025) for Annual physical with fasting labs the week prior.       WINSOME Fox           Note to patient: The 21st Century Cures Act makes medical notes like these available to patients in the interest of transparency. However, be advised this is a medical document. It is intended as peer to peer communication. It is written in medical language and may contain abbreviations or verbiage that are unfamiliar. It may appear blunt or direct. Medical documents are intended to carry relevant information, facts as evident, and the clinical opinion of the practitioner.

## 2025-06-05 ENCOUNTER — OFFICE VISIT (OUTPATIENT)
Age: 30
End: 2025-06-05
Payer: COMMERCIAL

## 2025-06-05 VITALS
DIASTOLIC BLOOD PRESSURE: 81 MMHG | OXYGEN SATURATION: 96 % | HEIGHT: 58 IN | HEART RATE: 63 BPM | WEIGHT: 158 LBS | SYSTOLIC BLOOD PRESSURE: 119 MMHG | BODY MASS INDEX: 33.17 KG/M2

## 2025-06-05 DIAGNOSIS — F42.2 MIXED OBSESSIONAL THOUGHTS AND ACTS: Primary | ICD-10-CM

## 2025-06-05 DIAGNOSIS — R63.39 FOOD AVERSION: ICD-10-CM

## 2025-06-05 RX ORDER — SERTRALINE HYDROCHLORIDE 100 MG/1
200 TABLET, FILM COATED ORAL DAILY
Qty: 180 TABLET | Refills: 1 | Status: SHIPPED | OUTPATIENT
Start: 2025-06-05 | End: 2025-12-02

## 2025-06-05 NOTE — PROGRESS NOTES
"     Office  Follow Up Visit      Patient Name: Rianna Montano  : 1995   MRN: [id number]     Referring Provider: Marita Cervantes APRN    Chief Complaint:  \"I have been doing good\"     ICD-10-CM ICD-9-CM   1. Mixed obsessional thoughts and acts  F42.2 300.3   2. Food aversion  R63.39 783.3      History of Present Illness:   Rianna Montano is a 30 y.o. female who is here today for follow up.  Patient was seen for initial evaluation on 2024 was diagnosed with OCD.  Patient has obsessive thoughts and compulsions that take up anywhere from 1 to 3 hours of her day each day.  In general patient's anxiety is pretty manageable is just that the obsessions and compulsions are time-consuming for patient.  Patient is a very picky eater and primarily only eats her \"safe\" foods but patient does not meet criteria for ARFID as she is never had any nutritional deficiencies or malnourishment.  During initial evaluation patient was referred to [facility] for [facility] disorders for therapy.  Patient came to provider taking Zoloft 50 mg daily and over the past couple of months her dose has been increased to 200mg.  Dose was most recently increased to 200 mg daily during on 2025 to further improve OCD symptoms. Patient does do therapy at [facility] for [facility] disorders where she does exposure therapy.  Patient was last seen in clinic on 3/5/2025 for patient reported that her anxiety and depressive symptoms were all well-managed.  The amount of time patient consumes completing her compulsions has decreased.  Patient has been completing therapy at the eating disorder clinic and has continued to increase in number of foods in her diet.    Today patient is currently taking Zoloft 200 mg daily. Reports to be sleeping about 6-10 hours each night. Denies SI/HI/AVH. Appetite is good. In therapy she continues to increase the number of foods she eats. Recently tried onion, pineapple, and strawberries.  " Reports that her compulsions are much more manageable and consume less than an hour per day.  Is anxious at times but it is usually manageable. Denies feeling depressed.  Denies any side effects to her medications.    Subjective      Review of Systems:   Review of Systems   Constitutional:  Negative for appetite change.   Respiratory:  Negative for chest tightness and shortness of breath.    Gastrointestinal:  Negative for abdominal pain, constipation, diarrhea, nausea and vomiting.   Genitourinary:  Negative for difficulty urinating.   Neurological:  Negative for headaches.   Psychiatric/Behavioral:  Negative for hallucinations and suicidal ideas. The patient is not nervous/anxious.        PHQ-9 Depression Screening  Little interest or pleasure in doing things? Not at all   Feeling down, depressed, or hopeless? Not at all   PHQ-2 Total Score 0   Trouble falling or staying asleep, or sleeping too much? Not at all   Feeling tired or having little energy? Not at all   Poor appetite or overeating? Not at all   Feeling bad about yourself - or that you are a failure or have let yourself or your family down? Not at all   Trouble concentrating on things, such as reading the newspaper or watching television? Not at all   Moving or speaking so slowly that other people could have noticed? Or the opposite - being so fidgety or restless that you have been moving around a lot more than usual? Not at all     Thoughts that you would be better off dead, or of hurting yourself in some way? Not at all   PHQ-9 Total Score 0   If you checked off any problems, how difficult have these problems made it for you to do your work, take care of things at home, or get along with other people? Not difficult at all         ROXANN-7      Over the last two weeks, how often have you been bothered by the following problems?  Feeling nervous, anxious or on edge: Several days  Not being able to stop or control worrying: Not at all  Worrying too much  about different things: Several days  Trouble Relaxing: Not at all  Being so restless that it is hard to sit still: Not at all  Becoming easily annoyed or irritable: Not at all  Feeling afraid as if something awful might happen: Not at all  ROXANN 7 Total Score: 2  If you checked any problems, how difficult have these problems made it for you to do your work, take care of things at home, or get along with other people: Not difficult at all    Patient History:   The following portions of the patient's history were reviewed and updated as appropriate: allergies, current medications, past family history, past medical history, past social history, past surgical history and problem list.     Social History     Socioeconomic History    Marital status: Single   Tobacco Use    Smoking status: Never    Smokeless tobacco: Never   Vaping Use    Vaping status: Never Used   Substance and Sexual Activity    Alcohol use: No    Drug use: No    Sexual activity: Never     Birth control/protection: I.U.D.     Medications:     Current Outpatient Medications:     sertraline (ZOLOFT) 100 MG tablet, Take 2 tablets by mouth Daily for 180 days., Disp: 180 tablet, Rfl: 1    methylPREDNISolone (MEDROL) 4 MG dose pack, Take as directed on package instructions., Disp: 21 tablet, Rfl: 0    Multiple Vitamins-Minerals (Multivitamin Adult) chewable tablet, Chew., Disp: , Rfl:     Ruxolitinib Phosphate (Opzelura) 1.5 % cream, Apply 1 Application topically Daily. Indications: Atopic Dermatitis, Disp: 60 g, Rfl: 3    spironolactone (ALDACTONE) 100 MG tablet, Take 1 tablet by mouth Daily., Disp: , Rfl:     tacrolimus (Protopic) 0.1 % ointment, Apply 1 Application topically to the appropriate area as directed 2 (Two) Times a Day. Indications: Atopic Dermatitis (Patient taking differently: Apply 1 Application topically to the appropriate area as directed 2 (Two) Times a Day.), Disp: 100 g, Rfl: 3    Objective     Physical Exam:  Vital Signs:   Vitals:     "06/05/25 1310   BP: 119/81   Pulse: 63   SpO2: 96%   Weight: 71.7 kg (158 lb)   Height: 147.3 cm (58\")     Body mass index is 33.02 kg/m².     Mental Status Exam:   MENTAL STATUS EXAM   General Appearance:  Cleanly groomed and dressed  Eye Contact:  Good eye contact  Attitude:  Cooperative  Motor Activity:  Normal gait, posture  Muscle Strength:  Normal  Speech:  Normal rate, tone, volume  Language:  Spontaneous  Mood and affect:  Normal, pleasant  Hopelessness:  Denies  Loneliness: Denies  Thought Process:  Logical  Associations/ Thought Content:  No delusions  Hallucinations:  None  Suicidal Ideations:  Not present  Homicidal Ideation:  Not present  Sensorium:  Alert and clear  Orientation:  Person, place, time and situation  Attention Span/ Concentration:  Good  Fund of Knowledge:  Appropriate for age and educational level  Intellectual Functioning:  Average range  Insight:  Good  Judgement:  Good  Reliability:  Good  Impulse Control:  Good       @RESULASTCBCDIFFPANEL,TSH,LABLIPI,VVODBTGL59,TONDEGPC29,MG,FOLATE,PROLACTIN,CRPRESULT,CMP,T1PZWMZPPFR)@    Lab Results   Component Value Date    GLUCOSE 93 10/25/2024    BUN 16 10/25/2024    CREATININE 0.71 10/25/2024    EGFR 119 10/25/2024    BCR 23 10/25/2024    K 4.3 10/25/2024    CO2 19 (L) 10/25/2024    CALCIUM 9.8 10/25/2024    ALBUMIN 4.4 10/25/2024    BILITOT 0.2 10/25/2024    AST 24 10/25/2024    ALT 30 10/25/2024       Lab Results   Component Value Date    WBC 7.9 10/25/2024    HGB 14.6 10/25/2024    HCT 44.7 10/25/2024    MCV 82 10/25/2024     10/25/2024       Lab Results   Component Value Date    CHOL 223 (A) 03/23/2022    CHLPL 200 (H) 10/25/2024    TRIG 93 10/25/2024    HDL 65 10/25/2024     (H) 10/25/2024       TSH          10/25/2024    10:35   TSH   TSH 3.150           Assessment / Plan      Assessment:  Patient is a 30-year-old female with a history of OCD and food aversion.  Patient was last seen in clinic on 3/5/2025 when patient " reported her anxiety and depressive symptoms were well-managed.  Patient was continued on Zoloft 200 mg daily.  Today patient reports that she continues to do well in regards to her anxiety.  Reports that her compulsions are no longer time-consuming or more manageable.  States that she is greatly [facility] and has increased the number of foods in her diet.  Will see patient 4 months but patient knows she can reach out sooner if needed.    Diagnoses and all orders for this visit:    1. Mixed obsessional thoughts and acts (Primary)    2. Food aversion    Other orders  -     sertraline (ZOLOFT) 100 MG tablet; Take 2 tablets by mouth Daily for 180 days.  Dispense: 180 tablet; Refill: 1       Plan/patient education:   Continue Zoloft 200mg daily.   Continue with [facility].  Discussed medication options and treatment plan of prescribed medication as well as the risks, benefits, and side effects   Encouraged pt to continue supportive psychotherapy efforts and medications as indicated.  Educated pt on signs and symptoms of serotonin syndrome and notified pt to go to the ER if experiencing these symptoms.   Notified pt that antidepressants can sometimes cause worsening SI and to monitor for this.      Short-term goals: Continue to develop rapport with patient.     Long-term goals: Symptom reduction with medication and therapy.     Weaknesses: Picky eating.     Strengths: Great support from family.    Continue supportive psychotherapy efforts and medications as indicated. Treatment and medication options discussed during today's visit. Patient ackowledged and verbally consented to continue with current treatment plan and was educated on the importance of compliance with treatment and follow-up appointments. Patient seems reasonably able to adhere to treatment plan.      Medication Considerations:  Discussed medication options and  treatment plan of prescribed medication(s) as well as the risks, benefits, and potential side effects. Patient is agreeable to call the office with any worsening of symptoms or onset of side effects. Patient is agreeable to call 911 or go to the nearest ER should he/she begin having SI/HI.    Quality Measures:   Patient does not use tobacco products.     Jad reviewed and is appropriate.    Follow Up:   Return in about 4 months (around 10/5/2025) for Recheck.    Copied text in this note has been reviewed and is accurate as of 6/5/2025.    Part of this note may be an electronic transcription/translation of spoken language to printed text using the Dragon Dictation System.    WINSOME Lr, PMHNP-BC

## (undated) DEVICE — IRRIGATOR BULB ASEPTO 60CC STRL

## (undated) DEVICE — SYR LL TP 10ML STRL

## (undated) DEVICE — TRY SKINPREP DRYPREP

## (undated) DEVICE — ULTRACLEAN ACCESSORY ELECTRODE 1" (2.54 CM) COATED NEEDLE: Brand: ULTRACLEAN

## (undated) DEVICE — SOL PVPI ANTISEPT SCRB 4OZ

## (undated) DEVICE — PENCL E/S HNDSWCH PUSHBTN HOLSTR 10FT

## (undated) DEVICE — TP SILK DURAPORE 2 IN

## (undated) DEVICE — GLV SURG SENSICARE W/ALOE PF LF 6.5 STRL

## (undated) DEVICE — DRP Z/FRICTION 10X16IN

## (undated) DEVICE — DECANTER: Brand: UNBRANDED

## (undated) DEVICE — NDL HYPO PRECISIONGLIDE REG 25G 1 1/2

## (undated) DEVICE — SUT VIC 3/0 SH 27IN J416H

## (undated) DEVICE — SOL ANTISEP SCRB PVPI 7.5PCT 4OZ

## (undated) DEVICE — SUT MNCRYL 4/0 PS2 18 IN

## (undated) DEVICE — CONTAINER,SPECIMEN,OR STERILE,4OZ: Brand: MEDLINE

## (undated) DEVICE — SKIN AFFIX SURG ADHESIVE 72/CS 0.55ML: Brand: MEDLINE